# Patient Record
Sex: MALE | Race: WHITE | NOT HISPANIC OR LATINO
[De-identification: names, ages, dates, MRNs, and addresses within clinical notes are randomized per-mention and may not be internally consistent; named-entity substitution may affect disease eponyms.]

---

## 2017-01-20 ENCOUNTER — RECORDS - HEALTHEAST (OUTPATIENT)
Dept: ADMINISTRATIVE | Facility: OTHER | Age: 57
End: 2017-01-20

## 2017-02-17 ENCOUNTER — RECORDS - HEALTHEAST (OUTPATIENT)
Dept: ADMINISTRATIVE | Facility: OTHER | Age: 57
End: 2017-02-17

## 2017-07-05 ENCOUNTER — COMMUNICATION - HEALTHEAST (OUTPATIENT)
Dept: INTERNAL MEDICINE | Facility: CLINIC | Age: 57
End: 2017-07-05

## 2017-07-05 DIAGNOSIS — I50.9 CHF (CONGESTIVE HEART FAILURE) (H): ICD-10-CM

## 2017-07-05 DIAGNOSIS — K21.9 GERD (GASTROESOPHAGEAL REFLUX DISEASE): ICD-10-CM

## 2017-07-05 DIAGNOSIS — I25.10 CAD (CORONARY ARTERY DISEASE): ICD-10-CM

## 2017-07-11 ENCOUNTER — COMMUNICATION - HEALTHEAST (OUTPATIENT)
Dept: CARDIOLOGY | Facility: CLINIC | Age: 57
End: 2017-07-11

## 2017-07-11 DIAGNOSIS — I25.10 CAD (CORONARY ARTERY DISEASE): ICD-10-CM

## 2017-07-11 DIAGNOSIS — I25.10 CORONARY ATHEROSCLEROSIS OF NATIVE CORONARY ARTERY: ICD-10-CM

## 2017-07-26 ENCOUNTER — COMMUNICATION - HEALTHEAST (OUTPATIENT)
Dept: CARDIOLOGY | Facility: CLINIC | Age: 57
End: 2017-07-26

## 2017-07-26 DIAGNOSIS — I25.10 CAD (CORONARY ARTERY DISEASE): ICD-10-CM

## 2017-08-14 ENCOUNTER — OFFICE VISIT - HEALTHEAST (OUTPATIENT)
Dept: CARDIOLOGY | Facility: CLINIC | Age: 57
End: 2017-08-14

## 2017-08-14 DIAGNOSIS — I42.9 CARDIOMYOPATHY (H): ICD-10-CM

## 2017-08-14 DIAGNOSIS — I25.10 CORONARY ARTERY DISEASE INVOLVING NATIVE CORONARY ARTERY OF NATIVE HEART WITHOUT ANGINA PECTORIS: ICD-10-CM

## 2017-08-14 ASSESSMENT — MIFFLIN-ST. JEOR: SCORE: 1841.84

## 2017-08-24 ENCOUNTER — HOSPITAL ENCOUNTER (OUTPATIENT)
Dept: CARDIOLOGY | Facility: CLINIC | Age: 57
Discharge: HOME | End: 2017-08-24
Attending: INTERNAL MEDICINE

## 2017-08-24 DIAGNOSIS — I25.10 CORONARY ARTERY DISEASE INVOLVING NATIVE CORONARY ARTERY OF NATIVE HEART WITHOUT ANGINA PECTORIS: ICD-10-CM

## 2017-08-24 DIAGNOSIS — I42.9 CARDIOMYOPATHY (H): ICD-10-CM

## 2017-08-24 LAB
AORTIC ROOT: 3.3 CM
BSA FOR ECHO PROCEDURE: 2.24 M2
CV ECHO HEIGHT: 70 IN
CV ECHO WEIGHT: 225 LBS
DOP CALC LVOT AREA: 3.14 CM2
DOP CALC LVOT DIAMETER: 2 CM
ECHO EJECTION FRACTION ESTIMATED: 35 %
EJECTION FRACTION: 43 % (ref 55–75)
FRACTIONAL SHORTENING: 21.1 % (ref 28–44)
INTERVENTRICULAR SEPTUM IN END DIASTOLE: 1.04 CM (ref 0.6–1)
IVS/PW RATIO: 1.1
LA AREA 1: 14.6 CM2
LA AREA 2: 13.8 CM2
LEFT ATRIUM LENGTH: 4.11 CM
LEFT ATRIUM SIZE: 4.6 CM
LEFT ATRIUM TO AORTIC ROOT RATIO: 1.39 NO UNITS
LEFT ATRIUM VOLUME INDEX: 18.6 ML/M2
LEFT ATRIUM VOLUME: 41.7 CM3
LEFT VENTRICLE DIASTOLIC VOLUME INDEX: 35.7 CM3/M2 (ref 34–74)
LEFT VENTRICLE DIASTOLIC VOLUME: 80 CM3 (ref 62–150)
LEFT VENTRICLE HEART RATE: 60 BPM
LEFT VENTRICLE MASS INDEX: 129.3 G/M2
LEFT VENTRICLE SYSTOLIC VOLUME INDEX: 20.5 CM3/M2 (ref 11–31)
LEFT VENTRICLE SYSTOLIC VOLUME: 46 CM3 (ref 21–61)
LEFT VENTRICULAR INTERNAL DIMENSION IN DIASTOLE: 6.6 CM (ref 4.2–5.8)
LEFT VENTRICULAR INTERNAL DIMENSION IN SYSTOLE: 5.21 CM (ref 2.5–4)
LEFT VENTRICULAR MASS: 289.7 G
LEFT VENTRICULAR POSTERIOR WALL IN END DIASTOLE: 0.95 CM (ref 0.6–1)
MITRAL VALVE DECELERATION SLOPE: 1320 MM/S2
MITRAL VALVE E/A RATIO: 0.8
MITRAL VALVE PRESSURE HALF-TIME: 104 MS
MV AVERAGE E/E' RATIO: 7.1 CM/S
MV DECELERATION TIME: 261 MS
MV E'TISSUE VEL-LAT: 7.8 CM/S
MV E'TISSUE VEL-MED: 6.63 CM/S
MV LATERAL E/E' RATIO: 6.6
MV MEDIAL E/E' RATIO: 7.8
MV PEAK A VELOCITY: 63.6 CM/S
MV PEAK E VELOCITY: 51.5 CM/S
MV VALVE AREA PRESSURE 1/2 METHOD: 2.1 CM2
NUC REST DIASTOLIC VOLUME INDEX: 3600 LBS
NUC REST SYSTOLIC VOLUME INDEX: 70 IN

## 2017-08-24 ASSESSMENT — MIFFLIN-ST. JEOR: SCORE: 1841.84

## 2017-08-28 ENCOUNTER — RECORDS - HEALTHEAST (OUTPATIENT)
Dept: ADMINISTRATIVE | Facility: OTHER | Age: 57
End: 2017-08-28

## 2017-09-12 ENCOUNTER — COMMUNICATION - HEALTHEAST (OUTPATIENT)
Dept: INTERNAL MEDICINE | Facility: CLINIC | Age: 57
End: 2017-09-12

## 2017-09-28 ENCOUNTER — OFFICE VISIT - HEALTHEAST (OUTPATIENT)
Dept: INTERNAL MEDICINE | Facility: CLINIC | Age: 57
End: 2017-09-28

## 2017-09-28 DIAGNOSIS — M17.9 KNEE OSTEOARTHRITIS: ICD-10-CM

## 2017-09-28 DIAGNOSIS — Z01.818 PRE-OP EXAM: ICD-10-CM

## 2017-09-28 DIAGNOSIS — I25.10 CORONARY ARTERY DISEASE INVOLVING NATIVE CORONARY ARTERY OF NATIVE HEART WITHOUT ANGINA PECTORIS: ICD-10-CM

## 2017-10-02 LAB
ATRIAL RATE - MUSE: 63 BPM
DIASTOLIC BLOOD PRESSURE - MUSE: NORMAL MMHG
INTERPRETATION ECG - MUSE: NORMAL
P AXIS - MUSE: 34 DEGREES
PR INTERVAL - MUSE: 188 MS
QRS DURATION - MUSE: 108 MS
QT - MUSE: 422 MS
QTC - MUSE: 431 MS
R AXIS - MUSE: -28 DEGREES
SYSTOLIC BLOOD PRESSURE - MUSE: NORMAL MMHG
T AXIS - MUSE: 83 DEGREES
VENTRICULAR RATE- MUSE: 63 BPM

## 2017-10-05 ENCOUNTER — COMMUNICATION - HEALTHEAST (OUTPATIENT)
Dept: INTERNAL MEDICINE | Facility: CLINIC | Age: 57
End: 2017-10-05

## 2017-10-05 DIAGNOSIS — I50.9 CHF (CONGESTIVE HEART FAILURE) (H): ICD-10-CM

## 2017-10-05 DIAGNOSIS — K21.9 GERD (GASTROESOPHAGEAL REFLUX DISEASE): ICD-10-CM

## 2017-10-05 DIAGNOSIS — I25.10 CORONARY ATHEROSCLEROSIS OF NATIVE CORONARY ARTERY: ICD-10-CM

## 2017-10-05 DIAGNOSIS — I25.10 CAD (CORONARY ARTERY DISEASE): ICD-10-CM

## 2017-10-16 ENCOUNTER — RECORDS - HEALTHEAST (OUTPATIENT)
Dept: ADMINISTRATIVE | Facility: OTHER | Age: 57
End: 2017-10-16

## 2017-10-30 ENCOUNTER — RECORDS - HEALTHEAST (OUTPATIENT)
Dept: ADMINISTRATIVE | Facility: OTHER | Age: 57
End: 2017-10-30

## 2017-11-27 ENCOUNTER — RECORDS - HEALTHEAST (OUTPATIENT)
Dept: ADMINISTRATIVE | Facility: OTHER | Age: 57
End: 2017-11-27

## 2018-03-13 ENCOUNTER — COMMUNICATION - HEALTHEAST (OUTPATIENT)
Dept: CARDIOLOGY | Facility: CLINIC | Age: 58
End: 2018-03-13

## 2018-04-16 ENCOUNTER — RECORDS - HEALTHEAST (OUTPATIENT)
Dept: ADMINISTRATIVE | Facility: OTHER | Age: 58
End: 2018-04-16

## 2018-07-12 ENCOUNTER — AMBULATORY - HEALTHEAST (OUTPATIENT)
Dept: CARDIOLOGY | Facility: CLINIC | Age: 58
End: 2018-07-12

## 2018-07-12 DIAGNOSIS — I25.10 CAD (CORONARY ARTERY DISEASE): ICD-10-CM

## 2018-07-12 DIAGNOSIS — I25.5 ISCHEMIC CARDIOMYOPATHY: ICD-10-CM

## 2018-08-06 ENCOUNTER — HOSPITAL ENCOUNTER (OUTPATIENT)
Dept: CARDIOLOGY | Facility: CLINIC | Age: 58
Discharge: HOME | End: 2018-08-06
Attending: INTERNAL MEDICINE

## 2018-08-06 DIAGNOSIS — I25.5 ISCHEMIC CARDIOMYOPATHY: ICD-10-CM

## 2018-08-06 DIAGNOSIS — I25.10 CAD (CORONARY ARTERY DISEASE): ICD-10-CM

## 2018-08-06 LAB
AORTIC ROOT: 3.7 CM
BSA FOR ECHO PROCEDURE: 2.25 M2
CV BLOOD PRESSURE: NORMAL MMHG
CV ECHO HEIGHT: 70 IN
CV ECHO WEIGHT: 227 LBS
DOP CALC LVOT AREA: 3.46 CM2
DOP CALC LVOT DIAMETER: 2.1 CM
DOP CALC LVOT PEAK VEL: 66.9 CM/S
DOP CALC LVOT STROKE VOLUME: 39.8 CM3
DOP CALCLVOT PEAK VEL VTI: 11.5 CM
ECHO EJECTION FRACTION ESTIMATED: 35 %
EJECTION FRACTION: 31 % (ref 55–75)
FRACTIONAL SHORTENING: 14.6 % (ref 28–44)
INTERVENTRICULAR SEPTUM IN END DIASTOLE: 0.8 CM (ref 0.6–1)
IVS/PW RATIO: 0.9
LA AREA 1: 16.2 CM2
LA AREA 2: 11.4 CM2
LEFT ATRIUM LENGTH: 4.27 CM
LEFT ATRIUM SIZE: 3.6 CM
LEFT ATRIUM TO AORTIC ROOT RATIO: 0.97 NO UNITS
LEFT ATRIUM VOLUME INDEX: 16.3 ML/M2
LEFT ATRIUM VOLUME: 36.8 ML
LEFT VENTRICLE DIASTOLIC VOLUME INDEX: 76.9 CM3/M2 (ref 34–74)
LEFT VENTRICLE DIASTOLIC VOLUME: 173 CM3 (ref 62–150)
LEFT VENTRICLE MASS INDEX: 84.3 G/M2
LEFT VENTRICLE SYSTOLIC VOLUME INDEX: 53.3 CM3/M2 (ref 11–31)
LEFT VENTRICLE SYSTOLIC VOLUME: 120 CM3 (ref 21–61)
LEFT VENTRICULAR INTERNAL DIMENSION IN DIASTOLE: 5.75 CM (ref 4.2–5.8)
LEFT VENTRICULAR INTERNAL DIMENSION IN SYSTOLE: 4.91 CM (ref 2.5–4)
LEFT VENTRICULAR MASS: 189.7 G
LEFT VENTRICULAR OUTFLOW TRACT MEAN GRADIENT: 1 MMHG
LEFT VENTRICULAR OUTFLOW TRACT MEAN VELOCITY: 50.5 CM/S
LEFT VENTRICULAR OUTFLOW TRACT PEAK GRADIENT: 2 MMHG
LEFT VENTRICULAR POSTERIOR WALL IN END DIASTOLE: 0.93 CM (ref 0.6–1)
LV STROKE VOLUME INDEX: 17.7 ML/M2
MITRAL VALVE E/A RATIO: 0.7
MV E'TISSUE VEL-LAT: 7.8 CM/S
MV LATERAL E/E' RATIO: 5.8
MV PEAK A VELOCITY: 68.6 CM/S
MV PEAK E VELOCITY: 45.4 CM/S
NUC REST DIASTOLIC VOLUME INDEX: 3632 LBS
NUC REST SYSTOLIC VOLUME INDEX: 70 IN
TRICUSPID REGURGITATION PEAK PRESSURE GRADIENT: 21.5 MMHG
TRICUSPID VALVE PEAK REGURGITANT VELOCITY: 232 CM/S

## 2018-08-06 ASSESSMENT — MIFFLIN-ST. JEOR: SCORE: 1850.92

## 2018-08-16 ENCOUNTER — OFFICE VISIT - HEALTHEAST (OUTPATIENT)
Dept: CARDIOLOGY | Facility: CLINIC | Age: 58
End: 2018-08-16

## 2018-08-16 DIAGNOSIS — I25.10 ATHEROSCLEROSIS OF NATIVE CORONARY ARTERY OF NATIVE HEART WITHOUT ANGINA PECTORIS: ICD-10-CM

## 2018-08-16 DIAGNOSIS — I50.20 SYSTOLIC CONGESTIVE HEART FAILURE, UNSPECIFIED CONGESTIVE HEART FAILURE CHRONICITY: ICD-10-CM

## 2018-08-16 DIAGNOSIS — I25.10 CORONARY ARTERY DISEASE INVOLVING NATIVE CORONARY ARTERY OF NATIVE HEART WITHOUT ANGINA PECTORIS: ICD-10-CM

## 2018-08-16 DIAGNOSIS — K21.9 GERD (GASTROESOPHAGEAL REFLUX DISEASE): ICD-10-CM

## 2018-08-16 ASSESSMENT — MIFFLIN-ST. JEOR: SCORE: 1882.67

## 2018-10-17 ENCOUNTER — RECORDS - HEALTHEAST (OUTPATIENT)
Dept: ADMINISTRATIVE | Facility: OTHER | Age: 58
End: 2018-10-17

## 2018-10-18 ENCOUNTER — COMMUNICATION - HEALTHEAST (OUTPATIENT)
Dept: CARDIOLOGY | Facility: CLINIC | Age: 58
End: 2018-10-18

## 2018-10-18 DIAGNOSIS — I25.10 CORONARY ARTERY DISEASE INVOLVING NATIVE CORONARY ARTERY OF NATIVE HEART WITHOUT ANGINA PECTORIS: ICD-10-CM

## 2019-04-19 ENCOUNTER — COMMUNICATION - HEALTHEAST (OUTPATIENT)
Dept: ANTICOAGULATION | Facility: CLINIC | Age: 59
End: 2019-04-19

## 2019-04-19 DIAGNOSIS — I10 ESSENTIAL HYPERTENSION: ICD-10-CM

## 2019-08-22 ENCOUNTER — COMMUNICATION - HEALTHEAST (OUTPATIENT)
Dept: CARDIOLOGY | Facility: CLINIC | Age: 59
End: 2019-08-22

## 2019-08-28 ENCOUNTER — AMBULATORY - HEALTHEAST (OUTPATIENT)
Dept: CARDIOLOGY | Facility: CLINIC | Age: 59
End: 2019-08-28

## 2019-08-28 DIAGNOSIS — I42.9 CARDIOMYOPATHY (H): ICD-10-CM

## 2019-08-28 DIAGNOSIS — I25.10 CAD (CORONARY ARTERY DISEASE): ICD-10-CM

## 2019-09-25 ENCOUNTER — COMMUNICATION - HEALTHEAST (OUTPATIENT)
Dept: CARDIOLOGY | Facility: CLINIC | Age: 59
End: 2019-09-25

## 2019-09-25 DIAGNOSIS — I25.10 CORONARY ARTERY DISEASE INVOLVING NATIVE CORONARY ARTERY OF NATIVE HEART WITHOUT ANGINA PECTORIS: ICD-10-CM

## 2019-09-25 DIAGNOSIS — K21.9 GERD (GASTROESOPHAGEAL REFLUX DISEASE): ICD-10-CM

## 2019-09-25 DIAGNOSIS — I50.20 SYSTOLIC CONGESTIVE HEART FAILURE (H): ICD-10-CM

## 2019-09-27 ENCOUNTER — COMMUNICATION - HEALTHEAST (OUTPATIENT)
Dept: CARDIOLOGY | Facility: CLINIC | Age: 59
End: 2019-09-27

## 2019-09-27 DIAGNOSIS — K21.9 GERD (GASTROESOPHAGEAL REFLUX DISEASE): ICD-10-CM

## 2019-10-01 ENCOUNTER — COMMUNICATION - HEALTHEAST (OUTPATIENT)
Dept: CARDIOLOGY | Facility: CLINIC | Age: 59
End: 2019-10-01

## 2019-10-01 DIAGNOSIS — K21.9 GERD (GASTROESOPHAGEAL REFLUX DISEASE): ICD-10-CM

## 2019-10-15 ENCOUNTER — HOSPITAL ENCOUNTER (OUTPATIENT)
Dept: CARDIOLOGY | Facility: CLINIC | Age: 59
Discharge: HOME | End: 2019-10-15
Attending: INTERNAL MEDICINE

## 2019-10-15 DIAGNOSIS — I25.10 CAD (CORONARY ARTERY DISEASE): ICD-10-CM

## 2019-10-15 DIAGNOSIS — I42.9 CARDIOMYOPATHY (H): ICD-10-CM

## 2019-10-15 LAB
AORTIC ROOT: 3.6 CM
BSA FOR ECHO PROCEDURE: 2.29 M2
CV BLOOD PRESSURE: ABNORMAL %
CV ECHO HEIGHT: 70 IN
CV ECHO WEIGHT: 234 LBS
DOP CALC LVOT AREA: 3.14 CM2
DOP CALC LVOT DIAMETER: 2 CM
DOP CALC LVOT PEAK VEL: 93.2 CM/S
DOP CALC LVOT STROKE VOLUME: 56.5 CM3
DOP CALCLVOT PEAK VEL VTI: 18 CM
ECHO EJECTION FRACTION ESTIMATED: 40 %
EJECTION FRACTION: 48 % (ref 55–75)
FRACTIONAL SHORTENING: 18.9 % (ref 28–44)
INTERVENTRICULAR SEPTUM IN END DIASTOLE: 1.6 CM (ref 0.6–1)
IVS/PW RATIO: 1.3
LA AREA 1: 24.8 CM2
LA AREA 2: 24.8 CM2
LEFT ATRIUM LENGTH: 5.91 CM
LEFT ATRIUM SIZE: 4.3 CM
LEFT ATRIUM TO AORTIC ROOT RATIO: 1.19 NO UNITS
LEFT ATRIUM VOLUME INDEX: 38.6 ML/M2
LEFT ATRIUM VOLUME: 88.5 ML
LEFT VENTRICLE CARDIAC INDEX: 1.4 L/MIN/M2
LEFT VENTRICLE CARDIAC OUTPUT: 3.3 L/MIN
LEFT VENTRICLE DIASTOLIC VOLUME INDEX: 98.3 CM3/M2 (ref 34–74)
LEFT VENTRICLE DIASTOLIC VOLUME: 225 CM3 (ref 62–150)
LEFT VENTRICLE HEART RATE: 58 BPM
LEFT VENTRICLE MASS INDEX: 146.8 G/M2
LEFT VENTRICLE SYSTOLIC VOLUME INDEX: 51.5 CM3/M2 (ref 11–31)
LEFT VENTRICLE SYSTOLIC VOLUME: 118 CM3 (ref 21–61)
LEFT VENTRICULAR INTERNAL DIMENSION IN DIASTOLE: 5.5 CM (ref 4.2–5.8)
LEFT VENTRICULAR INTERNAL DIMENSION IN SYSTOLE: 4.46 CM (ref 2.5–4)
LEFT VENTRICULAR MASS: 336.2 G
LEFT VENTRICULAR OUTFLOW TRACT MEAN GRADIENT: 2 MMHG
LEFT VENTRICULAR OUTFLOW TRACT MEAN VELOCITY: 68.6 CM/S
LEFT VENTRICULAR OUTFLOW TRACT PEAK GRADIENT: 3 MMHG
LEFT VENTRICULAR POSTERIOR WALL IN END DIASTOLE: 1.19 CM (ref 0.6–1)
LV STROKE VOLUME INDEX: 24.7 ML/M2
MITRAL VALVE E/A RATIO: 0.7
MV AVERAGE E/E' RATIO: 7.4 CM/S
MV DECELERATION TIME: 345 MS
MV E'TISSUE VEL-LAT: 6.63 CM/S
MV E'TISSUE VEL-MED: 6.73 CM/S
MV LATERAL E/E' RATIO: 7.5
MV MEDIAL E/E' RATIO: 7.3
MV PEAK A VELOCITY: 75.5 CM/S
MV PEAK E VELOCITY: 49.4 CM/S
NUC REST DIASTOLIC VOLUME INDEX: 3744 LBS
NUC REST SYSTOLIC VOLUME INDEX: 70 IN
TRICUSPID VALVE ANULAR PLANE SYSTOLIC EXCURSION: 2.3 CM

## 2019-10-15 ASSESSMENT — MIFFLIN-ST. JEOR: SCORE: 1882.67

## 2019-10-16 ENCOUNTER — COMMUNICATION - HEALTHEAST (OUTPATIENT)
Dept: CARDIOLOGY | Facility: CLINIC | Age: 59
End: 2019-10-16

## 2019-10-16 DIAGNOSIS — K21.9 GERD (GASTROESOPHAGEAL REFLUX DISEASE): ICD-10-CM

## 2019-10-19 ENCOUNTER — COMMUNICATION - HEALTHEAST (OUTPATIENT)
Dept: CARDIOLOGY | Facility: CLINIC | Age: 59
End: 2019-10-19

## 2019-10-19 DIAGNOSIS — I25.10 ATHEROSCLEROSIS OF NATIVE CORONARY ARTERY OF NATIVE HEART WITHOUT ANGINA PECTORIS: ICD-10-CM

## 2019-10-20 ENCOUNTER — COMMUNICATION - HEALTHEAST (OUTPATIENT)
Dept: CARDIOLOGY | Facility: CLINIC | Age: 59
End: 2019-10-20

## 2019-10-20 DIAGNOSIS — I25.10 CORONARY ARTERY DISEASE INVOLVING NATIVE CORONARY ARTERY OF NATIVE HEART WITHOUT ANGINA PECTORIS: ICD-10-CM

## 2019-10-25 ENCOUNTER — OFFICE VISIT - HEALTHEAST (OUTPATIENT)
Dept: CARDIOLOGY | Facility: CLINIC | Age: 59
End: 2019-10-25

## 2019-10-25 DIAGNOSIS — K21.9 GERD (GASTROESOPHAGEAL REFLUX DISEASE): ICD-10-CM

## 2019-10-25 DIAGNOSIS — I25.10 ATHEROSCLEROSIS OF NATIVE CORONARY ARTERY OF NATIVE HEART WITHOUT ANGINA PECTORIS: ICD-10-CM

## 2019-10-25 DIAGNOSIS — I25.10 CORONARY ARTERY DISEASE INVOLVING NATIVE CORONARY ARTERY OF NATIVE HEART WITHOUT ANGINA PECTORIS: ICD-10-CM

## 2019-10-25 DIAGNOSIS — I50.20 SYSTOLIC CONGESTIVE HEART FAILURE (H): ICD-10-CM

## 2019-10-25 RX ORDER — NITROGLYCERIN 0.3 MG/1
0.3 TABLET SUBLINGUAL EVERY 5 MIN PRN
Qty: 90 TABLET | Refills: 12 | Status: SHIPPED | OUTPATIENT
Start: 2019-10-25 | End: 2021-01-01

## 2020-10-22 ENCOUNTER — AMBULATORY - HEALTHEAST (OUTPATIENT)
Dept: NURSING | Facility: CLINIC | Age: 60
End: 2020-10-22

## 2020-11-12 ENCOUNTER — COMMUNICATION - HEALTHEAST (OUTPATIENT)
Dept: INTERNAL MEDICINE | Facility: CLINIC | Age: 60
End: 2020-11-12

## 2020-11-12 DIAGNOSIS — Z20.822 EXPOSURE TO COVID-19 VIRUS: ICD-10-CM

## 2020-11-17 ENCOUNTER — OFFICE VISIT - HEALTHEAST (OUTPATIENT)
Dept: LAB | Facility: CLINIC | Age: 60
End: 2020-11-17

## 2020-11-17 DIAGNOSIS — Z20.822 EXPOSURE TO COVID-19 VIRUS: ICD-10-CM

## 2020-11-19 ENCOUNTER — COMMUNICATION - HEALTHEAST (OUTPATIENT)
Dept: SCHEDULING | Facility: CLINIC | Age: 60
End: 2020-11-19

## 2020-12-22 ENCOUNTER — COMMUNICATION - HEALTHEAST (OUTPATIENT)
Dept: CARDIOLOGY | Facility: CLINIC | Age: 60
End: 2020-12-22

## 2020-12-22 DIAGNOSIS — I25.10 CORONARY ARTERY DISEASE INVOLVING NATIVE CORONARY ARTERY OF NATIVE HEART WITHOUT ANGINA PECTORIS: ICD-10-CM

## 2020-12-22 DIAGNOSIS — I50.20 SYSTOLIC CONGESTIVE HEART FAILURE (H): ICD-10-CM

## 2021-01-01 ENCOUNTER — AMBULATORY - HEALTHEAST (OUTPATIENT)
Dept: NURSING | Facility: CLINIC | Age: 61
End: 2021-01-01

## 2021-01-01 ENCOUNTER — COMMUNICATION - HEALTHEAST (OUTPATIENT)
Dept: CARDIOLOGY | Facility: CLINIC | Age: 61
End: 2021-01-01

## 2021-01-01 ENCOUNTER — APPOINTMENT (OUTPATIENT)
Dept: CT IMAGING | Facility: HOSPITAL | Age: 61
End: 2021-01-01
Attending: EMERGENCY MEDICINE
Payer: COMMERCIAL

## 2021-01-01 ENCOUNTER — RECORDS - HEALTHEAST (OUTPATIENT)
Dept: ADMINISTRATIVE | Facility: CLINIC | Age: 61
End: 2021-01-01

## 2021-01-01 ENCOUNTER — HEALTH MAINTENANCE LETTER (OUTPATIENT)
Age: 61
End: 2021-01-01

## 2021-01-01 ENCOUNTER — OFFICE VISIT - HEALTHEAST (OUTPATIENT)
Dept: CARDIOLOGY | Facility: CLINIC | Age: 61
End: 2021-01-01

## 2021-01-01 ENCOUNTER — HOSPITAL ENCOUNTER (EMERGENCY)
Facility: HOSPITAL | Age: 61
End: 2021-12-29
Attending: EMERGENCY MEDICINE | Admitting: EMERGENCY MEDICINE
Payer: COMMERCIAL

## 2021-01-01 ENCOUNTER — IMMUNIZATION (OUTPATIENT)
Dept: NURSING | Facility: CLINIC | Age: 61
End: 2021-01-01
Payer: COMMERCIAL

## 2021-01-01 VITALS
RESPIRATION RATE: 16 BRPM | DIASTOLIC BLOOD PRESSURE: 84 MMHG | HEART RATE: 58 BPM | SYSTOLIC BLOOD PRESSURE: 122 MMHG | WEIGHT: 237 LBS | OXYGEN SATURATION: 94 % | BODY MASS INDEX: 34.01 KG/M2

## 2021-01-01 VITALS — BODY MASS INDEX: 32.5 KG/M2 | HEIGHT: 70 IN | WEIGHT: 227 LBS

## 2021-01-01 VITALS — BODY MASS INDEX: 32.21 KG/M2 | WEIGHT: 225 LBS | HEIGHT: 70 IN

## 2021-01-01 VITALS — BODY MASS INDEX: 33.5 KG/M2 | WEIGHT: 234 LBS | HEIGHT: 70 IN

## 2021-01-01 VITALS — WEIGHT: 227.3 LBS | BODY MASS INDEX: 32.61 KG/M2

## 2021-01-01 VITALS — WEIGHT: 234 LBS | BODY MASS INDEX: 33.5 KG/M2 | HEIGHT: 70 IN

## 2021-01-01 VITALS — WEIGHT: 225 LBS | HEIGHT: 70 IN | BODY MASS INDEX: 32.21 KG/M2

## 2021-01-01 DIAGNOSIS — K21.9 GERD (GASTROESOPHAGEAL REFLUX DISEASE): ICD-10-CM

## 2021-01-01 DIAGNOSIS — I25.10 CORONARY ARTERY DISEASE INVOLVING NATIVE CORONARY ARTERY OF NATIVE HEART WITHOUT ANGINA PECTORIS: ICD-10-CM

## 2021-01-01 DIAGNOSIS — S01.21XA LACERATION OF NOSE, INITIAL ENCOUNTER: ICD-10-CM

## 2021-01-01 DIAGNOSIS — I49.01 VENTRICULAR FIBRILLATION (H): ICD-10-CM

## 2021-01-01 DIAGNOSIS — I25.10 CORONARY ARTERY DISEASE INVOLVING NATIVE CORONARY ARTERY OF NATIVE HEART, ANGINA PRESENCE UNSPECIFIED: ICD-10-CM

## 2021-01-01 DIAGNOSIS — I46.9 CARDIAC ARREST (H): ICD-10-CM

## 2021-01-01 DIAGNOSIS — I50.20 SYSTOLIC CONGESTIVE HEART FAILURE (H): ICD-10-CM

## 2021-01-01 DIAGNOSIS — I24.9 ACS (ACUTE CORONARY SYNDROME) (H): ICD-10-CM
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
## 2021-01-01 DIAGNOSIS — I25.10 ATHEROSCLEROSIS OF NATIVE CORONARY ARTERY OF NATIVE HEART WITHOUT ANGINA PECTORIS: ICD-10-CM

## 2021-01-01 LAB
ABO/RH(D): NORMAL
ACANTHOCYTES BLD QL SMEAR: SLIGHT
ALBUMIN SERPL-MCNC: 2.3 G/DL (ref 3.5–5)
ALP SERPL-CCNC: 135 U/L (ref 45–120)
ALT SERPL W P-5'-P-CCNC: 337 U/L (ref 0–45)
ANION GAP SERPL CALCULATED.3IONS-SCNC: 24 MMOL/L (ref 5–18)
ANION GAP SERPL CALCULATED.3IONS-SCNC: 30 MMOL/L (ref 5–18)
ANTIBODY SCREEN: NEGATIVE
APTT PPP: 38 SECONDS (ref 22–38)
AST SERPL W P-5'-P-CCNC: 393 U/L (ref 0–40)
BASE EXCESS BLDA CALC-SCNC: -17.3 MMOL/L (ref -9.6–2)
BASOPHILS # BLD MANUAL: 0 10E3/UL (ref 0–0.2)
BASOPHILS # BLD MANUAL: 0.3 10E3/UL (ref 0–0.2)
BASOPHILS NFR BLD MANUAL: 0 %
BASOPHILS NFR BLD MANUAL: 2 %
BILIRUB SERPL-MCNC: 0.2 MG/DL (ref 0–1)
BUN SERPL-MCNC: 18 MG/DL (ref 8–22)
BUN SERPL-MCNC: 19 MG/DL (ref 8–22)
BURR CELLS BLD QL SMEAR: SLIGHT
CALCIUM SERPL-MCNC: 8.6 MG/DL (ref 8.5–10.5)
CALCIUM SERPL-MCNC: 9.7 MG/DL (ref 8.5–10.5)
CHLORIDE BLD-SCNC: 103 MMOL/L (ref 98–107)
CHLORIDE BLD-SCNC: 104 MMOL/L (ref 98–107)
CO2 SERPL-SCNC: 14 MMOL/L (ref 22–31)
CO2 SERPL-SCNC: 8 MMOL/L (ref 22–31)
COHGB MFR BLD: 33.2 % (ref 96–97)
COHGB MFR BLD: 41.7 % (ref 96–97)
CREAT SERPL-MCNC: 1.94 MG/DL (ref 0.7–1.3)
CREAT SERPL-MCNC: 2.31 MG/DL (ref 0.7–1.3)
EOSINOPHIL # BLD MANUAL: 0.1 10E3/UL (ref 0–0.7)
EOSINOPHIL # BLD MANUAL: 0.2 10E3/UL (ref 0–0.7)
EOSINOPHIL NFR BLD MANUAL: 1 %
EOSINOPHIL NFR BLD MANUAL: 1 %
ERYTHROCYTE [DISTWIDTH] IN BLOOD BY AUTOMATED COUNT: 12.5 % (ref 10–15)
ERYTHROCYTE [DISTWIDTH] IN BLOOD BY AUTOMATED COUNT: 12.6 % (ref 10–15)
GFR SERPL CREATININE-BSD FRML MDRD: 32 ML/MIN/1.73M2
GFR SERPL CREATININE-BSD FRML MDRD: 39 ML/MIN/1.73M2
GLUCOSE BLD-MCNC: 296 MG/DL (ref 70–125)
GLUCOSE BLD-MCNC: 449 MG/DL (ref 70–125)
HCO3 BLDA-SCNC: 10 MMOL/L (ref 23–29)
HCT VFR BLD AUTO: 45.5 % (ref 40–53)
HCT VFR BLD AUTO: 47.6 % (ref 40–53)
HGB BLD-MCNC: 13.8 G/DL (ref 13.3–17.7)
HGB BLD-MCNC: 14.7 G/DL (ref 13.3–17.7)
HOLD SPECIMEN: NORMAL
INR PPP: 1.36 (ref 0.85–1.15)
INR PPP: 1.8 (ref 0.85–1.15)
LACTATE SERPL-SCNC: 17 MMOL/L (ref 0.7–2)
LYMPHOCYTES # BLD MANUAL: 4 10E3/UL (ref 0.8–5.3)
LYMPHOCYTES # BLD MANUAL: 6.6 10E3/UL (ref 0.8–5.3)
LYMPHOCYTES NFR BLD MANUAL: 42 %
LYMPHOCYTES NFR BLD MANUAL: 64 %
MAGNESIUM SERPL-MCNC: 3.4 MG/DL (ref 1.8–2.6)
MCH RBC QN AUTO: 30.3 PG (ref 26.5–33)
MCH RBC QN AUTO: 30.3 PG (ref 26.5–33)
MCHC RBC AUTO-ENTMCNC: 30.3 G/DL (ref 31.5–36.5)
MCHC RBC AUTO-ENTMCNC: 30.9 G/DL (ref 31.5–36.5)
MCV RBC AUTO: 100 FL (ref 78–100)
MCV RBC AUTO: 98 FL (ref 78–100)
METAMYELOCYTES # BLD MANUAL: 0.1 10E3/UL
METAMYELOCYTES # BLD MANUAL: 0.6 10E3/UL
METAMYELOCYTES NFR BLD MANUAL: 1 %
METAMYELOCYTES NFR BLD MANUAL: 4 %
MONOCYTES # BLD MANUAL: 0.4 10E3/UL (ref 0–1.3)
MONOCYTES # BLD MANUAL: 0.5 10E3/UL (ref 0–1.3)
MONOCYTES NFR BLD MANUAL: 3 %
MONOCYTES NFR BLD MANUAL: 7 %
MYELOCYTES # BLD MANUAL: 0.1 10E3/UL
MYELOCYTES # BLD MANUAL: 0.3 10E3/UL
MYELOCYTES NFR BLD MANUAL: 1 %
MYELOCYTES NFR BLD MANUAL: 2 %
NEUTROPHILS # BLD MANUAL: 1.6 10E3/UL (ref 1.6–8.3)
NEUTROPHILS # BLD MANUAL: 7.2 10E3/UL (ref 1.6–8.3)
NEUTROPHILS NFR BLD MANUAL: 26 %
NEUTROPHILS NFR BLD MANUAL: 46 %
NRBC # BLD AUTO: 0.1 10E3/UL
NRBC # BLD AUTO: 0.2 10E3/UL
NRBC BLD MANUAL-RTO: 1 %
NRBC BLD MANUAL-RTO: 1 %
PCO2 BLDA: 80 MM HG (ref 35–45)
PCO2 BLDA: 95 MM HG (ref 35–45)
PH BLDA: 6.85 [PH] (ref 7.37–7.44)
PH BLDA: <6.82 [PH] (ref 7.37–7.44)
PLAT MORPH BLD: ABNORMAL
PLAT MORPH BLD: ABNORMAL
PLATELET # BLD AUTO: 243 10E3/UL (ref 150–450)
PLATELET # BLD AUTO: 325 10E3/UL (ref 150–450)
PO2 BLDA: 32 MM HG (ref 80–90)
PO2 BLDA: 40 MM HG (ref 80–90)
POTASSIUM BLD-SCNC: 3.8 MMOL/L (ref 3.5–5)
POTASSIUM BLD-SCNC: 6.8 MMOL/L (ref 3.5–5)
PROT SERPL-MCNC: 4.9 G/DL (ref 6–8)
RBC # BLD AUTO: 4.56 10E6/UL (ref 4.4–5.9)
RBC # BLD AUTO: 4.85 10E6/UL (ref 4.4–5.9)
RBC MORPH BLD: ABNORMAL
RBC MORPH BLD: ABNORMAL
SODIUM SERPL-SCNC: 141 MMOL/L (ref 136–145)
SODIUM SERPL-SCNC: 142 MMOL/L (ref 136–145)
SPECIMEN EXPIRATION DATE: NORMAL
TROPONIN I SERPL-MCNC: 1.04 NG/ML (ref 0–0.29)
VARIANT LYMPHS BLD QL SMEAR: PRESENT
WBC # BLD AUTO: 15.7 10E3/UL (ref 4–11)
WBC # BLD AUTO: 6.2 10E3/UL (ref 4–11)

## 2021-01-01 PROCEDURE — 91300 PR COVID VAC PFIZER DIL RECON 30 MCG/0.3 ML IM: CPT

## 2021-01-01 PROCEDURE — 70450 CT HEAD/BRAIN W/O DYE: CPT

## 2021-01-01 PROCEDURE — C1769 GUIDE WIRE: HCPCS | Performed by: INTERNAL MEDICINE

## 2021-01-01 PROCEDURE — 90686 IIV4 VACC NO PRSV 0.5 ML IM: CPT

## 2021-01-01 PROCEDURE — 85610 PROTHROMBIN TIME: CPT | Performed by: EMERGENCY MEDICINE

## 2021-01-01 PROCEDURE — 84484 ASSAY OF TROPONIN QUANT: CPT | Performed by: EMERGENCY MEDICINE

## 2021-01-01 PROCEDURE — 85730 THROMBOPLASTIN TIME PARTIAL: CPT | Performed by: EMERGENCY MEDICINE

## 2021-01-01 PROCEDURE — 0004A PR COVID VAC PFIZER DIL RECON 30 MCG/0.3 ML IM: CPT

## 2021-01-01 PROCEDURE — 250N000009 HC RX 250: Performed by: INTERNAL MEDICINE

## 2021-01-01 PROCEDURE — 272N000001 HC OR GENERAL SUPPLY STERILE: Performed by: INTERNAL MEDICINE

## 2021-01-01 PROCEDURE — 85027 COMPLETE CBC AUTOMATED: CPT | Performed by: EMERGENCY MEDICINE

## 2021-01-01 PROCEDURE — 83735 ASSAY OF MAGNESIUM: CPT | Performed by: EMERGENCY MEDICINE

## 2021-01-01 PROCEDURE — 80053 COMPREHEN METABOLIC PANEL: CPT | Performed by: EMERGENCY MEDICINE

## 2021-01-01 PROCEDURE — 250N000011 HC RX IP 250 OP 636: Performed by: INTERNAL MEDICINE

## 2021-01-01 PROCEDURE — 86901 BLOOD TYPING SEROLOGIC RH(D): CPT | Performed by: INTERNAL MEDICINE

## 2021-01-01 PROCEDURE — 82805 BLOOD GASES W/O2 SATURATION: CPT

## 2021-01-01 PROCEDURE — 96374 THER/PROPH/DIAG INJ IV PUSH: CPT | Mod: 59

## 2021-01-01 PROCEDURE — 86850 RBC ANTIBODY SCREEN: CPT | Performed by: INTERNAL MEDICINE

## 2021-01-01 PROCEDURE — C1894 INTRO/SHEATH, NON-LASER: HCPCS | Performed by: INTERNAL MEDICINE

## 2021-01-01 PROCEDURE — 90471 IMMUNIZATION ADMIN: CPT

## 2021-01-01 PROCEDURE — 36415 COLL VENOUS BLD VENIPUNCTURE: CPT | Performed by: EMERGENCY MEDICINE

## 2021-01-01 PROCEDURE — 93454 CORONARY ARTERY ANGIO S&I: CPT | Performed by: INTERNAL MEDICINE

## 2021-01-01 PROCEDURE — 83605 ASSAY OF LACTIC ACID: CPT | Performed by: INTERNAL MEDICINE

## 2021-01-01 PROCEDURE — 36415 COLL VENOUS BLD VENIPUNCTURE: CPT | Performed by: INTERNAL MEDICINE

## 2021-01-01 PROCEDURE — 92960 CARDIOVERSION ELECTRIC EXT: CPT

## 2021-01-01 PROCEDURE — 31500 INSERT EMERGENCY AIRWAY: CPT

## 2021-01-01 PROCEDURE — 93454 CORONARY ARTERY ANGIO S&I: CPT | Mod: 26 | Performed by: INTERNAL MEDICINE

## 2021-01-01 PROCEDURE — 99285 EMERGENCY DEPT VISIT HI MDM: CPT | Mod: 25

## 2021-01-01 PROCEDURE — 93454 CORONARY ARTERY ANGIO S&I: CPT

## 2021-01-01 RX ORDER — DOBUTAMINE HYDROCHLORIDE 200 MG/100ML
INJECTION INTRAVENOUS CONTINUOUS PRN
Status: COMPLETED | OUTPATIENT
Start: 2021-01-01 | End: 2021-01-01

## 2021-01-01 RX ORDER — ASCORBIC ACID 500 MG
500 TABLET ORAL DAILY
Status: SHIPPED | COMMUNITY
Start: 2021-01-01

## 2021-01-01 RX ORDER — ATROPINE SULFATE 0.1 MG/ML
1 INJECTION INTRAVENOUS ONCE
Status: DISCONTINUED | OUTPATIENT
Start: 2021-01-01 | End: 2021-12-29 | Stop reason: HOSPADM

## 2021-01-01 RX ORDER — ASPIRIN 81 MG/1
324 TABLET, CHEWABLE ORAL ONCE
Status: DISCONTINUED | OUTPATIENT
Start: 2021-01-01 | End: 2021-12-29 | Stop reason: HOSPADM

## 2021-01-01 RX ORDER — METOPROLOL SUCCINATE 50 MG/1
TABLET, EXTENDED RELEASE ORAL
Qty: 180 TABLET | Refills: 3 | Status: SHIPPED | OUTPATIENT
Start: 2021-01-01

## 2021-01-01 RX ORDER — NOREPINEPHRINE BITARTRATE 0.02 MG/ML
.01-.6 INJECTION, SOLUTION INTRAVENOUS CONTINUOUS
Status: DISCONTINUED | OUTPATIENT
Start: 2021-01-01 | End: 2021-12-29 | Stop reason: ALTCHOICE

## 2021-01-01 RX ORDER — LOSARTAN POTASSIUM 25 MG/1
25 TABLET ORAL DAILY
Qty: 90 TABLET | Refills: 3 | Status: SHIPPED | OUTPATIENT
Start: 2021-01-01

## 2021-01-01 RX ORDER — EPINEPHRINE IN SOD CHLOR,ISO 1 MG/10 ML
SYRINGE (ML) INTRAVENOUS
Status: DISCONTINUED
Start: 2021-01-01 | End: 2021-01-01 | Stop reason: HOSPADM

## 2021-01-01 RX ORDER — LACTOBACILLUS RHAMNOSUS GG 15B CELL
1 CAPSULE, SPRINKLE ORAL
Status: SHIPPED | COMMUNITY
Start: 2021-01-01

## 2021-01-01 RX ORDER — EPINEPHRINE IN SOD CHLOR,ISO 1 MG/10 ML
SYRINGE (ML) INTRAVENOUS
Status: DISCONTINUED | OUTPATIENT
Start: 2021-01-01 | End: 2021-12-29 | Stop reason: HOSPADM

## 2021-01-01 RX ORDER — SPIRONOLACTONE 25 MG/1
TABLET ORAL
Qty: 90 TABLET | Refills: 3 | Status: SHIPPED | OUTPATIENT
Start: 2021-01-01

## 2021-06-02 NOTE — PROGRESS NOTES
[facility] Care Note    Assessment / Plan:    Mr. Carey is doing well from the cardiac standpoint having had prior multivessel PCI for severe coronary artery disease and a severe ischemic cardiomyopathy.      As detailed below, he had successful PCI of a chronically occluded right coronary artery as well as a diseased circumflex which resulted in an improvement in his ejection fraction from 25% to 40% and a significant improvement in his functional capacity.      His anterior wall was assessed with cardiac MRI and was found to be largely nonviable, which is why the LAD  has been left untreated.      As he is currently doing well, the goal will be ongoing risk factor modification for which he has been asked to continue his current medical regimen.      He also knows to continue his dual antiplatelet therapy, but should be able to stop it for any elective procedures.     A repeat echocardiogram was also obtained to reassess his left ventricular systolic function and showed stability to perhaps slight improvement, with an EF of 40-45 %.        Thank you for the opportunity to participate in the care of Joey Carey. Please do not hesitate to call with any questions or concerns regarding his cardiovascular status    ______________________________________________________________________    Subjective:    It was a pleasure to see Joey Carey at the [facility] in follow-up for his coronary artery disease and cardiomyopathy.       Joey Carey is a 59 y.o. male who presented with accelerating chest pain on June 24, 2015 and had an EKG that was concerning for an anterior ST elevation myocardial infarction. He underwent urgent coronary angiography at the time and was found to have an occluded mid LAD, occluded mid RCA and 90% ulcerated lesion in the proximal to mid circumflex. He was referred for coronary artery bypass surgery but further evaluation during his hospitalization revealed a  large left ventricular thrombus with an ejection fraction of 25-30%.       Given the presence of the thrombus his bypass surgery was canceled and a viability study was scheduled. It was felt that he would be a better candidate for percutaneous revascularization if it was felt to be an option.       His MRI viability study subsequently showed good viability in the inferoseptal region. The mid to distal anterior wall and apex did not appear to be viable however. The MRI again confirmed the presence of a large apical thrombus with ejection fraction of 24%.      He then underwent PCI of his proximal circumflex as well as a moderate to large diagonal with Promus drug eluting stents.      He returned a few weeks later and underwent successful PCI of the occluded RCA, and following that revascularization had an improvement in his left ventricular systolic function to 35-40%.      He subsequently had resolution of his apical thrombus and good clinical improvement with resumption of normal activities and increase in his functional capacity.      In 2016, he was hospitalized with lower GI bleed and an associated non-ST elevation myocardial infarction.  He underwent coronary angiography which showed focal restenosis of his proximal RCA that was treated with cutting balloon angioplasty.  His circumflex had patent stents, and the LAD as before was chronically occluded with septal collaterals to the mid LAD.      Since then, he has done well with no recurrent cardiac symptoms. Specifically denies any chest discomfort or shortness of breath and has been golfing almost every day this summer.     He did have another echocardiogram performed on October 17  ______________________________________________________________________    Problem List:  Patient Active Problem List   Diagnosis     Dyslipidemia, goal LDL below 70     Hypertension     GERD (gastroesophageal reflux disease)     Left ventricular apical thrombus     Calculus of  ureter     Hydronephrosis with urinary obstruction due to ureteral calculus [592.1, 591]     GI bleed     Ischemic cardiomyopathy     CAD (coronary artery disease)     Dyslipidemia     Non-ST elevation myocardial infarction (NSTEMI) (H)       Medical History:  Past Medical History:   Diagnosis Date     CAD (coronary artery disease)      Dyslipidemia, goal LDL below 70      History of anesthesia complications     Nausea     Ischemic cardiomyopathy      Kidney stone      Left ventricular thrombosis      Myocardial infarction (H)      STEMI (ST elevation myocardial infarction) (H)        Surgical History:  Past Surgical History:   Procedure Laterality Date     CORONARY STENT PLACEMENT      Dr. Bansal     ESOPHAGOGASTRODUODENOSCOPY N/A 7/5/2016    Procedure: ESOPHAGOGASTRODUODENOSCOPY;  Surgeon: Fahad Donald MD;  Location: [facility];  Service:      UT KNEE SCOPE,DIAGNOSTIC      Description: Arthroscopy Knee Left;  Recorded: 07/06/2009;  Comments: 1990       Social History:  Social History     Socioeconomic History     Marital status:      Spouse name: Not on file     Number of children: Not on file     Years of education: Not on file     Highest education level: Not on file   Occupational History     Occupation: Retired   Social Needs     Financial resource strain: Not on file     Food insecurity:     Worry: Not on file     Inability: Not on file     Transportation needs:     Medical: Not on file     Non-medical: Not on file   Tobacco Use     Smoking status: Never Smoker     Smokeless tobacco: Never Used   Substance and Sexual Activity     Alcohol use: Yes     Alcohol/week: 4.0 - 5.0 standard drinks     Types: 4 - 5 Glasses of wine per week     Drug use: No     Sexual activity: Yes     Partners: Female     Birth control/protection: None   Lifestyle     Physical activity:     Days per week: Not on file     Minutes per session: Not on file     Stress: Not on file   Relationships     Social connections:     Talks  on phone: Not on file     Gets together: Not on file     Attends Restorationist service: Not on file     Active member of club or organization: Not on file     Attends meetings of clubs or organizations: Not on file     Relationship status: Not on file     Intimate partner violence:     Fear of current or ex partner: Not on file     Emotionally abused: Not on file     Physically abused: Not on file     Forced sexual activity: Not on file   Other Topics Concern     Not on file   Social History Narrative     Not on file       Review of Systems: 12 organ system review done and negative except as noted in the HPI.    Family History:  Family History   Problem Relation Age of Onset     Hyperlipidemia Mother      Hypertension Mother      Stroke Mother      Heart disease Father      Hypertension Father      Hyperlipidemia Father      No Medical Problems Sister      Hyperlipidemia Brother      Hypertension Brother      Urolithiasis Brother      No Medical Problems Daughter      No Medical Problems Son      No Medical Problems Maternal Aunt      No Medical Problems Maternal Uncle      No Medical Problems Paternal Aunt      No Medical Problems Paternal Uncle      No Medical Problems Maternal Grandmother      Colon cancer Maternal Grandfather      No Medical Problems Paternal Grandmother      No Medical Problems Paternal Grandfather          Allergies:  Allergies   Allergen Reactions     Crestor [Rosuvastatin] Myalgia     Lipitor [Atorvastatin] Myalgia     Pravastatin Myalgia       Medications:  Current Outpatient Medications   Medication Sig Dispense Refill     acetaminophen (TYLENOL) 650 MG CR tablet Take 650 mg by mouth every 8 (eight) hours as needed for pain.       aspirin 81 MG EC tablet Take 81 mg by mouth bedtime.        cholecalciferol, vitamin D3, (VITAMIN D3) 2,000 unit cap Take 2,000 Units by mouth daily.        losartan (COZAAR) 25 MG tablet Take 1 tablet (25 mg total) by mouth daily. 90 tablet 4     metoprolol  succinate (TOPROL-XL) 50 MG 24 hr tablet TAKE 1 TABLET BY MOUTH TWICE A  tablet 4     omeprazole (PRILOSEC) 20 MG capsule Take 1 capsule (20 mg total) by mouth daily. 90 capsule 3     spironolactone (ALDACTONE) 25 MG tablet TAKE 1 TABLET BY MOUTH EVERY DAY 90 tablet 4     ticagrelor (BRILINTA) 90 mg Tab Take 1 tablet (90 mg total) by mouth 2 (two) times a day. 180 tablet 4     nitroglycerin (NITROSTAT) 0.3 MG SL tablet Place 1 tablet (0.3 mg total) under the tongue every 5 (five) minutes as needed for chest pain. 90 tablet 12     No current facility-administered medications for this visit.        Objective:   Vital signs:  /84 (Patient Site: Left Arm, Patient Position: Sitting, Cuff Size: Adult Regular)   Pulse (!) 58   Resp 16   Wt (!) 237 lb (107.5 kg)   SpO2 94%   BMI 34.01 kg/m        Physical Exam:    GENERAL APPEARANCE: Alert, cooperative and in no acute distress.  HEENT: No scleral icterus. No Xanthelasma. Oral mucuos membranes pink and moist.  NECK: No JVD. Thyroid not visualized  CHEST: clear to auscultation  CARDIOVASCULAR: S1, S2 regular. No significant murmur noted.   PULSES: Radial and posterior tibial pulses are intact and symmetric.   ABDOMEN: Nontender. BS+.   EXTREMITIES: No cyanosis, clubbing or edema.  SKIN: Warm, well perfused  NEURO: Grossly nonfocal    Lab Results:  LIPIDS:  Lab Results   Component Value Date    CHOL 187 08/16/2016    CHOL 116 08/11/2015     Lab Results   Component Value Date    HDL 45 08/16/2016    HDL 32 (L) 08/11/2015     Lab Results   Component Value Date    LDLCALC 125 08/16/2016    LDLCALC 61 08/11/2015     Lab Results   Component Value Date    TRIG 83 08/16/2016    TRIG 117 08/11/2015     No components found for: CHOLHDL    BMP:  Lab Results   Component Value Date    CREATININE 1.11 09/28/2017    BUN 16 09/28/2017     09/28/2017    K 4.1 09/28/2017     09/28/2017    CO2 22 09/28/2017         ECG, Echo and Cath films independently  reviewed      RUSSEL TOM MD  [facility]

## 2021-06-02 NOTE — PATIENT INSTRUCTIONS - HE
It was good to see you in clinic today.    We are glad that you are continuing to do well.    Your recent echocardiogram was reviewed and continues to show stable heart function, with perhaps a slight improvement from before.    We would recommend staying on your current medications, and call anytime with questions or concerns.    Kane Bansal MD

## 2021-06-12 NOTE — PROGRESS NOTES
[facility] Heart Care Note    Assessment / Plan:    Mr. Carey is doing well from the cardiac standpoint having had prior multivessel PCI for severe coronary artery disease and a severe ischemic cardiomyopathy.     As detailed below, he had successful PCI of a chronically occluded right coronary artery as well as a diseased circumflex which resulted in an improvement in his ejection fraction from 25% to 45% and a significant improvement in his functional capacity.     His anterior wall was assessed with cardiac MRI and was found to be largely nonviable, which is why the LAD  has been left untreated.     As he is currently doing well, the goal will be ongoing risk factor modification for which he has been asked to continue his current medical regimen.     He does report intolerance to statins however and will therefore be evaluated for initiation of a PCSK9 inhibitor.  He also knows to continue his dual antiplatelet therapy for another year, but should be able to stop it for any elective procedures such as his planned knee replacement.    A repeat echocardiogram will be obtained to reassess his left ventricular systolic function to ensure stability.        Thank you for the opportunity to participate in the care of Joey Carey. Please do not hesitate to call with any questions or concerns regarding his cardiovascular status    ______________________________________________________________________    Subjective:    It was a pleasure to see Joey Carey at the [facility] in follow-up for his coronary artery disease and cardiomyopathy.      Joey Carey is a 55 y.o. male who presented with accelerating chest pain on June 24, 2015 and had an EKG that was concerning for an anterior ST elevation myocardial infarction. He underwent urgent coronary angiography at the time and was found to have an occluded mid LAD, occluded mid RCA and 90% ulcerated lesion in the proximal to mid circumflex. He  was referred for coronary artery bypass surgery but further evaluation during his hospitalization revealed a large left ventricular thrombus with an ejection fraction of 25-30%.      Given the presence of the thrombus his bypass surgery was canceled and a viability study was scheduled. It was felt that he would be a better candidate for percutaneous revascularization if it was felt to be an option.      His MRI viability study subsequently showed good viability in the inferoseptal region. The mid to distal anterior wall and apex did not appear to be viable however. The MRI again confirmed the presence of a large apical thrombus with ejection fraction of 24%.     He then underwent PCI of his proximal circumflex as well as a moderate to large diagonal with Promus drug eluting stents.     He returned a few weeks later and underwent successful PCI of the occluded RCA, and following that revascularization had an improvement in his left ventricular systolic function to 45%.     He subsequently had resolution of his apical thrombus and good clinical improvement with resumption of normal activities and increase in his functional capacity.     Last year he was hospitalized with lower GI bleed and an associated non-ST elevation myocardial infarction.  He underwent coronary angiography which showed focal restenosis of his proximal RCA that was treated with cutting balloon angioplasty.  His circumflex had patent stents, and the LAD as before was chronically occluded with septal collaterals to the mid LAD.     He states that he is doing well at this time with no recurrent symptoms.  Specifically denies any chest discomfort or shortness of breath and is looking for to increasing his activity level.     He does complain of increasing discomfort in his left knee from osteoarthritis, and is looking forward to having a knee replacement this fall.  ______________________________________________________________________    Problem  List:  Patient Active Problem List   Diagnosis     Dyslipidemia, goal LDL below 70     Hypertension     GERD (gastroesophageal reflux disease)     Left ventricular apical thrombus     Calculus of ureter     Hydronephrosis with urinary obstruction due to ureteral calculus [592.1, 591]     Urinary calculus, unspecified     GI bleed     Ischemic cardiomyopathy     CAD (coronary artery disease)     Dyslipidemia     Non-ST elevation myocardial infarction (NSTEMI)       Medical History:  Past Medical History:   Diagnosis Date     CAD (coronary artery disease)      Dyslipidemia, goal LDL below 70      History of anesthesia complications     Nausea     Ischemic cardiomyopathy      Kidney stone      Left ventricular thrombosis      Myocardial infarction      STEMI (ST elevation myocardial infarction)        Surgical History:  Past Surgical History:   Procedure Laterality Date     CORONARY STENT PLACEMENT      Dr. Bansal     ESOPHAGOGASTRODUODENOSCOPY N/A 7/5/2016    Procedure: ESOPHAGOGASTRODUODENOSCOPY;  Surgeon: Fahad Donald MD;  Location: [facility];  Service:      CA KNEE SCOPE,DIAGNOSTIC      Description: Arthroscopy Knee Left;  Recorded: 07/06/2009;  Comments: 1990       Social History:  Social History     Social History     Marital status:      Spouse name: N/A     Number of children: N/A     Years of education: N/A     Occupational History     Retired      Social History Main Topics     Smoking status: Never Smoker     Smokeless tobacco: Never Used     Alcohol use 2.4 - 3.0 oz/week     4 - 5 Glasses of wine per week     Drug use: No     Sexual activity: Yes     Partners: Female     Birth control/ protection: None     Other Topics Concern     Not on file     Social History Narrative       Review of Systems: 12 organ system review done and negative except as noted in the HPI.    Family History:  Family History   Problem Relation Age of Onset     Hyperlipidemia Mother      Hypertension Mother      Stroke Mother       Heart disease Father      Hypertension Father      Hyperlipidemia Father      No Medical Problems Sister      Hyperlipidemia Brother      Hypertension Brother      Urolithiasis Brother      No Medical Problems Daughter      No Medical Problems Son      No Medical Problems Maternal Aunt      No Medical Problems Maternal Uncle      No Medical Problems Paternal Aunt      No Medical Problems Paternal Uncle      No Medical Problems Maternal Grandmother      Colon cancer Maternal Grandfather      No Medical Problems Paternal Grandmother      No Medical Problems Paternal Grandfather          Allergies:  Allergies   Allergen Reactions     Crestor [Rosuvastatin] Myalgia     Lipitor [Atorvastatin] Myalgia     Pravastatin Myalgia       Medications:  Current Outpatient Prescriptions   Medication Sig Dispense Refill     acetaminophen (TYLENOL) 650 MG CR tablet Take 650 mg by mouth every 8 (eight) hours as needed for pain.       aspirin 81 MG EC tablet Take 81 mg by mouth bedtime.        cholecalciferol, vitamin D3, (VITAMIN D3) 2,000 unit cap Take 2,000 Units by mouth daily.        diclofenac sodium (VOLTAREN) 1 % Gel Apply 3 x/ daily for 1-2 minutes 100 g 3     losartan (COZAAR) 25 MG tablet Take 1 tablet (25 mg total) by mouth daily. 90 tablet 0     metoprolol succinate (TOPROL-XL) 50 MG 24 hr tablet TAKE 1 TABLET BY MOUTH TWICE DAILY 180 tablet 0     omeprazole (PRILOSEC) 20 MG capsule Take 1 capsule (20 mg total) by mouth daily. 90 capsule 0     spironolactone (ALDACTONE) 25 MG tablet TAKE 1 TABLET BY MOUTH DAILY 90 tablet 0     ticagrelor (BRILINTA) 90 mg Tab Take 1 tablet (90 mg total) by mouth 2 (two) times a day. 180 tablet 4     nitroglycerin (NITROSTAT) 0.3 MG SL tablet Place 1 tablet (0.3 mg total) under the tongue every 5 (five) minutes as needed for chest pain. 90 tablet 12     trolamine salicylate (ASPERCREME) 10 % cream Apply 1 application topically daily as needed (shoulder pain).       No current  "facility-administered medications for this visit.        Objective:   Vital signs:  /78 (Patient Site: Left Arm, Patient Position: Sitting, Cuff Size: Adult Large)  Pulse 64  Resp 18  Ht 5' 10\" (1.778 m)  Wt (!) 225 lb (102.1 kg)  BMI 32.28 kg/m2      Physical Exam:    GENERAL APPEARANCE: Alert, cooperative and in no acute distress.  HEENT: No scleral icterus. No Xanthelasma. Oral mucuos membranes pink and moist.  NECK: No JVD. Thyroid not visualized  CHEST: clear to auscultation  CARDIOVASCULAR: S1, S2 regular.    PULSES: Radial and posterior tibial pulses are intact and symmetric.   ABDOMEN: Nontender. BS+.   EXTREMITIES: No cyanosis, clubbing or edema.  SKIN: Warm, well perfused  NEURO: Grossly nonfocal    Lab Results:  LIPIDS:  Lab Results   Component Value Date    CHOL 187 08/16/2016    CHOL 116 08/11/2015     Lab Results   Component Value Date    HDL 45 08/16/2016    HDL 32 (L) 08/11/2015     Lab Results   Component Value Date    LDLCALC 125 08/16/2016    LDLCALC 61 08/11/2015     Lab Results   Component Value Date    TRIG 83 08/16/2016    TRIG 117 08/11/2015     No components found for: CHOLHDL    BMP:  Lab Results   Component Value Date    CREATININE 0.82 08/16/2016    BUN 20 08/16/2016     08/16/2016    K 4.5 08/16/2016     08/16/2016    CO2 25 08/16/2016         ECG and Cath films independently reviewed      RUSSEL TOM MD  [facility]              "

## 2021-06-13 NOTE — TELEPHONE ENCOUNTER
I contacted patient and advised I will follow to see if we can get this set up this weekend.  He is agreeable.  Shelley RUCKER CMA

## 2021-06-13 NOTE — PROGRESS NOTES
Assessment/Plan:      Visit for Preoperative Exam.       No contraindications for the planned procedure.  CBC, CMP, INR, APTT, type and cross, EKG  done today.EKG per my review - some change in T waves comparing to previous one done in 7/2016.  ADDENDUM:  All discussed with Dr Bansal, his Cardiologist:             Some changes on his ECG compared to prior; but nothing that would warrant further workup especially in the absence of symptoms.     I think it is ok for him to move forward with the surgery; but would recommend that anesthesia monitor his volume status closely given his cardiomyopathy (Echo shows an EF of 35%). Also if he can stay on his Toprol through the surgery that would be helpful.          You should stop taking aspirin and NSAID's ( ibuprofene, naproxen) 7-10 days prior procedure.  You should stop all supplements 10 days prior procedure ( fish oil, Q10, vitamins, etc).    Please take all your heart medications that you usually take in the morning on the morning of your procedure.    He also knows to continue his dual antiplatelet therapy for another year, but should be able to stop it for any elective procedures such as his planned knee replacement.    Subjective:     Scheduled Procedure: left knee replacement  Surgery Date:  10/16/17  Surgery Location:  [place] [id number]  Surgeon:  Dr. Mcdonald    Patient has severe left knee osteoarthritis.  He had successful PCI of a chronically occluded right coronary artery as well as a diseased circumflex which resulted in an improvement in his ejection fraction from 25% to 45% and a significant improvement in his functional capacity.His anterior wall was assessed with cardiac MRI and was found to be largely nonviable, which is why the LAD  has been left untreated.  As he is currently doing well, the goal will be ongoing risk factor modification for which he has been asked to continue his current medical regimen.  He does report  intolerance to statins however and will therefore be evaluated for initiation of a PCSK9 inhibitor.  He also knows to continue his dual antiplatelet therapy for another year, but should be able to stop it for any elective procedures such as his planned knee replacement.  He has history of GI bleed in July 2016. He underwent EGD without a bleeding source identified and received 1 unit pRBC. He never proceeded with colonoscopy.    Current Outpatient Prescriptions   Medication Sig Dispense Refill     acetaminophen (TYLENOL) 650 MG CR tablet Take 650 mg by mouth every 8 (eight) hours as needed for pain.       aspirin 81 MG EC tablet Take 81 mg by mouth bedtime.        cholecalciferol, vitamin D3, (VITAMIN D3) 2,000 unit cap Take 2,000 Units by mouth daily.        losartan (COZAAR) 25 MG tablet Take 1 tablet (25 mg total) by mouth daily. 90 tablet 0     metoprolol succinate (TOPROL-XL) 50 MG 24 hr tablet TAKE 1 TABLET BY MOUTH TWICE DAILY 180 tablet 0     omeprazole (PRILOSEC) 20 MG capsule Take 1 capsule (20 mg total) by mouth daily. 90 capsule 0     spironolactone (ALDACTONE) 25 MG tablet TAKE 1 TABLET BY MOUTH DAILY 90 tablet 0     ticagrelor (BRILINTA) 90 mg Tab Take 1 tablet (90 mg total) by mouth 2 (two) times a day. 180 tablet 4     nitroglycerin (NITROSTAT) 0.3 MG SL tablet Place 1 tablet (0.3 mg total) under the tongue every 5 (five) minutes as needed for chest pain. 90 tablet 12     No current facility-administered medications for this visit.        Allergies   Allergen Reactions     Crestor [Rosuvastatin] Myalgia     Lipitor [Atorvastatin] Myalgia     Pravastatin Myalgia       Immunization History   Administered Date(s) Administered     DT (pediatric) 09/16/1998     Influenza, seasonal,quad inj 36+ mos 11/17/2015     Td, historic 09/16/1998     Tdap 08/11/2015       Patient Active Problem List   Diagnosis     Dyslipidemia, goal LDL below 70     Hypertension     GERD (gastroesophageal reflux disease)     Left  ventricular apical thrombus     Calculus of ureter     Hydronephrosis with urinary obstruction due to ureteral calculus [592.1, 591]     GI bleed     Ischemic cardiomyopathy     CAD (coronary artery disease)     Dyslipidemia     Non-ST elevation myocardial infarction (NSTEMI)       Past Medical History:   Diagnosis Date     CAD (coronary artery disease)      Dyslipidemia, goal LDL below 70      History of anesthesia complications     Nausea     Ischemic cardiomyopathy      Kidney stone      Left ventricular thrombosis      Myocardial infarction      STEMI (ST elevation myocardial infarction)        Social History     Social History     Marital status:      Spouse name: N/A     Number of children: N/A     Years of education: N/A     Occupational History     Retired      Social History Main Topics     Smoking status: Never Smoker     Smokeless tobacco: Never Used     Alcohol use 2.4 - 3.0 oz/week     4 - 5 Glasses of wine per week     Drug use: No     Sexual activity: Yes     Partners: Female     Birth control/ protection: None     Other Topics Concern     Not on file     Social History Narrative       Past Surgical History:   Procedure Laterality Date     CORONARY STENT PLACEMENT      Dr. Bansal     ESOPHAGOGASTRODUODENOSCOPY N/A 7/5/2016    Procedure: ESOPHAGOGASTRODUODENOSCOPY;  Surgeon: Fahad Donald MD;  Location: [facility];  Service:      KS KNEE SCOPE,DIAGNOSTIC      Description: Arthroscopy Knee Left;  Recorded: 07/06/2009;  Comments: 1990       History of Present Illness  Recent Health  Fever: no  Chills: no  Fatigue: no  Chest Pain: no  Cough: no  Dyspnea: no  Urinary Frequency: no  Nausea: no  Vomiting: no  Diarrhea: no  Abdominal Pain: no  Easy Bruising: no  Lower Extremity Swelling: no  Poor Exercise Tolerance: no    Most recent Health Maintenance Visit:  1 year(s) ago    Pertinent History  Prior Anesthesia: yes  Previous Anesthesia Reaction:  no  Diabetes: no  Cardiovascular Disease:  no  Pulmonary Disease: no  Renal Disease: no  GI Disease: no  Sleep Apnea: no  Thromboembolic Problems: no  Clotting Disorder: no  Bleeding Disorder: no  Transfusion Reaction: no  Impaired Immunity: no  Steroid use in the last 6 months: no  Frequent Aspirin use: yes    Family history of MI and Stroke-mother, father    Social history of patient does not wear denture or partial plates, there is no transfusion refusal, NSAID use and there are no concerns regarding care after surgery    After surgery, the patient plans to recover at home with family.    Review of Systems  Constitutional: Negative.    HENT: Negative.    Eyes: Negative.    Respiratory: Negative.    Cardiovascular: Negative.    Gastrointestinal: Negative.    Endocrine: Negative.    Genitourinary: Negative.    Musculoskeletal: Negative.    Skin: Negative.              Objective:         Vitals:    09/28/17 1330   BP: 120/80   Pulse: 64   Weight: (!) 227 lb 4.8 oz (103.1 kg)       Physical Exam:  Constitutional:  oriented to person, place, and time, appears well-nourished. No distress.   HENT:   Head: Normocephalic.   Mouth/Throat: Oropharynx is clear and moist.   Eyes: Conjunctivae are normal. Pupils are equal, round, and reactive to light.   Neck: Normal range of motion. Neck supple.   Cardiovascular: Normal rate, regular rhythm and normal heart sounds.    Pulmonary/Chest: Effort normal and breath sounds normal.   Abdominal: Soft. Bowel sounds are normal.   Musculoskeletal: Normal range of motion.   Neurological: alert and oriented to person, place, and time. Skin: Skin is warm.   Psychiatric: normal mood and affect.

## 2021-06-19 NOTE — PROGRESS NOTES
[facility]    Assessment / Plan:    Mr. Carey is doing well from the cardiac standpoint having had prior multivessel PCI for severe coronary artery disease and a severe ischemic cardiomyopathy.      As detailed below, he had successful PCI of a chronically occluded right coronary artery as well as a diseased circumflex which resulted in an improvement in his ejection fraction from 25% to 40% and a significant improvement in his functional capacity.      His anterior wall was assessed with cardiac MRI and was found to be largely nonviable, which is why the LAD  has been left untreated.      As he is currently doing well, the goal will be ongoing risk factor modification for which he has been asked to continue his current medical regimen.      He does report intolerance to statins however and will therefore be evaluated for initiation of a PCSK9 inhibitor.  He also knows to continue his dual antiplatelet therapy, but should be able to stop it for any elective procedures.     A repeat echocardiogram was also obtained to reassess his left ventricular systolic function and showed stability with an EF of 35%.          Thank you for the opportunity to participate in the care of Joey Carey. Please do not hesitate to call with any questions or concerns regarding his cardiovascular status    ______________________________________________________________________    Subjective:    It was a pleasure to see Joey Carey at the [facility] in follow-up for his coronary artery disease and cardiomyopathy.       Joey Carey is a 58 y.o. male who presented with accelerating chest pain on June 24, 2015 and had an EKG that was concerning for an anterior ST elevation myocardial infarction. He underwent urgent coronary angiography at the time and was found to have an occluded mid LAD, occluded mid RCA and 90% ulcerated lesion in the proximal to mid circumflex. He was referred for coronary  artery bypass surgery but further evaluation during his hospitalization revealed a large left ventricular thrombus with an ejection fraction of 25-30%.       Given the presence of the thrombus his bypass surgery was canceled and a viability study was scheduled. It was felt that he would be a better candidate for percutaneous revascularization if it was felt to be an option.       His MRI viability study subsequently showed good viability in the inferoseptal region. The mid to distal anterior wall and apex did not appear to be viable however. The MRI again confirmed the presence of a large apical thrombus with ejection fraction of 24%.      He then underwent PCI of his proximal circumflex as well as a moderate to large diagonal with Promus drug eluting stents.      He returned a few weeks later and underwent successful PCI of the occluded RCA, and following that revascularization had an improvement in his left ventricular systolic function to 35-40%.      He subsequently had resolution of his apical thrombus and good clinical improvement with resumption of normal activities and increase in his functional capacity.      In 2016, he was hospitalized with lower GI bleed and an associated non-ST elevation myocardial infarction.  He underwent coronary angiography which showed focal restenosis of his proximal RCA that was treated with cutting balloon angioplasty.  His circumflex had patent stents, and the LAD as before was chronically occluded with septal collaterals to the mid LAD.      He states that he is doing well at this time with no recurrent symptoms.  Specifically denies any chest discomfort or shortness of breath and has been golfing almost every day this summer.      ______________________________________________________________________    Problem List:  Patient Active Problem List   Diagnosis     Dyslipidemia, goal LDL below 70     Hypertension     GERD (gastroesophageal reflux disease)     Left ventricular  apical thrombus     Calculus of ureter     Hydronephrosis with urinary obstruction due to ureteral calculus [592.1, 591]     GI bleed     Ischemic cardiomyopathy     CAD (coronary artery disease)     Dyslipidemia     Non-ST elevation myocardial infarction (NSTEMI) (H)       Medical History:  Past Medical History:   Diagnosis Date     CAD (coronary artery disease)      Dyslipidemia, goal LDL below 70      History of anesthesia complications     Nausea     Ischemic cardiomyopathy      Kidney stone      Left ventricular thrombosis      Myocardial infarction      STEMI (ST elevation myocardial infarction) (H)        Surgical History:  Past Surgical History:   Procedure Laterality Date     CORONARY STENT PLACEMENT      Dr. Bansal     ESOPHAGOGASTRODUODENOSCOPY N/A 7/5/2016    Procedure: ESOPHAGOGASTRODUODENOSCOPY;  Surgeon: Fahad Donald MD;  Location: [facility];  Service:      VT KNEE SCOPE,DIAGNOSTIC      Description: Arthroscopy Knee Left;  Recorded: 07/06/2009;  Comments: 1990       Social History:  Social History     Social History     Marital status:      Spouse name: N/A     Number of children: N/A     Years of education: N/A     Occupational History     Retired      Social History Main Topics     Smoking status: Never Smoker     Smokeless tobacco: Never Used     Alcohol use 2.4 - 3.0 oz/week     4 - 5 Glasses of wine per week     Drug use: No     Sexual activity: Yes     Partners: Female     Birth control/ protection: None     Other Topics Concern     Not on file     Social History Narrative       Review of Systems: 12 organ system review done and negative except as noted in the HPI.    Family History:  Family History   Problem Relation Age of Onset     Hyperlipidemia Mother      Hypertension Mother      Stroke Mother      Heart disease Father      Hypertension Father      Hyperlipidemia Father      No Medical Problems Sister      Hyperlipidemia Brother      Hypertension Brother      Urolithiasis Brother  "     No Medical Problems Daughter      No Medical Problems Son      No Medical Problems Maternal Aunt      No Medical Problems Maternal Uncle      No Medical Problems Paternal Aunt      No Medical Problems Paternal Uncle      No Medical Problems Maternal Grandmother      Colon cancer Maternal Grandfather      No Medical Problems Paternal Grandmother      No Medical Problems Paternal Grandfather          Allergies:  Allergies   Allergen Reactions     Crestor [Rosuvastatin] Myalgia     Lipitor [Atorvastatin] Myalgia     Pravastatin Myalgia       Medications:  Current Outpatient Prescriptions   Medication Sig Dispense Refill     acetaminophen (TYLENOL) 650 MG CR tablet Take 650 mg by mouth every 8 (eight) hours as needed for pain.       aspirin 81 MG EC tablet Take 81 mg by mouth bedtime.        cholecalciferol, vitamin D3, (VITAMIN D3) 2,000 unit cap Take 2,000 Units by mouth daily.        losartan (COZAAR) 25 MG tablet Take 1 tablet (25 mg total) by mouth daily. 90 tablet 3     metoprolol succinate (TOPROL-XL) 50 MG 24 hr tablet TAKE 1 TABLET BY MOUTH TWICE DAILY 180 tablet 3     omeprazole (PRILOSEC) 20 MG capsule Take 1 capsule (20 mg total) by mouth daily. 90 capsule 3     spironolactone (ALDACTONE) 25 MG tablet TAKE 1 TABLET BY MOUTH DAILY 90 tablet 3     ticagrelor (BRILINTA) 90 mg Tab Take 1 tablet (90 mg total) by mouth 2 (two) times a day. 180 tablet 4     nitroglycerin (NITROSTAT) 0.3 MG SL tablet Place 1 tablet (0.3 mg total) under the tongue every 5 (five) minutes as needed for chest pain. 90 tablet 12     No current facility-administered medications for this visit.        Objective:   Vital signs:  /80 (Patient Site: Right Arm, Patient Position: Sitting, Cuff Size: Adult Large)  Pulse 64  Resp 16  Ht 5' 10\" (1.778 m)  Wt (!) 234 lb (106.1 kg)  BMI 33.58 kg/m2      Physical Exam:    GENERAL APPEARANCE: Alert, cooperative and in no acute distress.  HEENT: No scleral icterus. No Xanthelasma. Oral " mucuos membranes pink and moist.  NECK: No JVD. Thyroid not visualized  CHEST: clear to auscultation  CARDIOVASCULAR: S1, S2 regular. No significant murmur noted.   PULSES: Radial and posterior tibial pulses are intact and symmetric.   ABDOMEN: Nontender. BS+. No bruits.  EXTREMITIES: No cyanosis, clubbing or edema.  SKIN: Warm, well perfused  NEURO: Grossly nonfocal    Lab Results:  LIPIDS:  Lab Results   Component Value Date    CHOL 187 08/16/2016    CHOL 116 08/11/2015     Lab Results   Component Value Date    HDL 45 08/16/2016    HDL 32 (L) 08/11/2015     Lab Results   Component Value Date    LDLCALC 125 08/16/2016    LDLCALC 61 08/11/2015     Lab Results   Component Value Date    TRIG 83 08/16/2016    TRIG 117 08/11/2015     No components found for: CHOLHDL    BMP:  Lab Results   Component Value Date    CREATININE 1.11 09/28/2017    BUN 16 09/28/2017     09/28/2017    K 4.1 09/28/2017     09/28/2017    CO2 22 09/28/2017         ECG and Cath films independently reviewed      RUSSEL TOM MD  [facility]

## 2021-06-30 NOTE — PROGRESS NOTES
"Progress Notes by Kane Bansal MD at 1/25/2021  8:10 AM     Author: Kane Bansal MD Service: -- Author Type: Physician    Filed: 1/25/2021  8:31 AM Encounter Date: 1/25/2021 Status: Signed    : Kane Bansal MD (Physician)           The patient has been notified of following:     \"This video visit will be conducted via a call between you and your physician/provider. We have found that certain health care needs can be provided without the need for an in-person physical exam.  This service lets us provide the care you need with a video conversation.  If a prescription is necessary we can send it directly to your pharmacy.  If lab work is needed we can place an order for that and you can then stop by our lab to have the test done at a later time.      Patient has given verbal consent to a Video visit? Yes    HEART CARE VIDEO ENCOUNTER        The patient has chosen to have the visit conducted as a video visit, to reduce risk of exposure given the current status of Coronavirus in our community. This video visit is being conducted via a call between the patient and physician/provider. Health care needs are being provided without a physical exam.     Assessment/Recommendations   Assessment/Plan:    Mr. Carey is doing well from the cardiac standpoint having had prior multivessel PCI for severe coronary artery disease and a severe ischemic cardiomyopathy.      As detailed below, he had successful PCI of a chronically occluded right coronary artery as well as a diseased circumflex which resulted in an improvement in his ejection fraction from 25% to 40% and a significant improvement in his functional capacity.      His anterior wall was assessed with cardiac MRI and was found to be largely nonviable, which is why the LAD  has been left untreated.      As he is currently doing well, the goal will be ongoing risk factor modification for which he has been asked to continue his current medical " regimen.      He also knows to continue his dual antiplatelet therapy, but should be able to stop it for any elective procedures.     A repeat echocardiogram was also obtained to reassess his left ventricular systolic function and showed stability to perhaps slight improvement, with an EF of 40-45 %.        Thank you for the opportunity to participate in the care of Joey Carey. Please do not hesitate to call with any questions or concerns regarding his cardiovascular status    Follow Up Plan:  12 months  I have reviewed the note as documented.  This accurately captures the substance of my conversation with the patient.    Total time of video between patient and provider was 10 minutes   Start Time:815   Stop Time:825    Originating Location (pt. Location): Home    Distant Location (provider location):  [facility]     Mode of Communication:  Video Conference via [email or web address]       History of Present Illness/Subjective    Joey Carey is a 60 y.o. male who is being evaluated via a billable video visit and has consented to a video visit.     It was a pleasure to see Joey Carey at the [facility] [place] in follow-up for his coronary artery disease and cardiomyopathy.       Joey Carey is a 60 y.o. male who presented with accelerating chest pain on June 24, 2015 and had an EKG that was concerning for an anterior ST elevation myocardial infarction. He underwent urgent coronary angiography at the time and was found to have an occluded mid LAD, occluded mid RCA and 90% ulcerated lesion in the proximal to mid circumflex. He was referred for coronary artery bypass surgery but further evaluation during his hospitalization revealed a large left ventricular thrombus with an ejection fraction of 25-30%.       Given the presence of the thrombus his bypass surgery was canceled and a viability study was scheduled. It was felt that he would be a better candidate for percutaneous  revascularization if it was felt to be an option.       His MRI viability study subsequently showed good viability in the inferoseptal region. The mid to distal anterior wall and apex did not appear to be viable however. The MRI again confirmed the presence of a large apical thrombus with ejection fraction of 24%.      He then underwent PCI of his proximal circumflex as well as a moderate to large diagonal with Promus drug eluting stents.      He returned a few weeks later and underwent successful PCI of the occluded RCA, and following that revascularization had an improvement in his left ventricular systolic function to 35-40%.      He subsequently had resolution of his apical thrombus and good clinical improvement with resumption of normal activities and increase in his functional capacity.      In 2016, he was hospitalized with lower GI bleed and an associated non-ST elevation myocardial infarction.  He underwent coronary angiography which showed focal restenosis of his proximal RCA that was treated with cutting balloon angioplasty.  His circumflex had patent stents, and the LAD as before was chronically occluded with septal collaterals to the mid LAD.      Since then, he has done well with no recurrent cardiac symptoms. Specifically denies any chest discomfort or shortness of breath and has been golfing almost every day this summer.   I have reviewed and updated the patient's Past Medical History, Social History, Family History and Medication List.     Physical Examination performed via live video encounter Review of Systems   General Appearance:   no distress, normal body habitus, upright.   ENT/Mouth: membranes moist, no nasal discharge or bleeding gums.  Normal head shape, no evidence of injury or laceration.     EYES:  no scleral icterus, normal conjunctivae   Neck: no evidence of thyromegaly.  Supple   Chest/Lungs:   No audible wheezing equal chest wall expansion. Non labored breathing.  No cough.    Cardiovascular:   No evidence of elevated jugular venous pressure.  No evidence of pitting edema bilaterally    Abdomen:  no evidence of abdominal distention. No observe juandice.     Extremities: no cyanosis or clubbing noted.    Skin: no xanthelasma, normal skin color. No evidence of facial lacerations.      Neurologic: Normal arm motion bilateral, no tremors.  No evidence of focal defect.       Psychiatric: alert and oriented x3, calm                                               Medical History  Surgical History Family History Social History   Past Medical History:   Diagnosis Date   ? CAD (coronary artery disease)    ? Dyslipidemia, goal LDL below 70    ? History of anesthesia complications     Nausea   ? Ischemic cardiomyopathy    ? Kidney stone    ? Left ventricular thrombosis    ? Myocardial infarction (H)    ? STEMI (ST elevation myocardial infarction) (H)     Past Surgical History:   Procedure Laterality Date   ? CORONARY STENT PLACEMENT      Dr. Bansal   ? ESOPHAGOGASTRODUODENOSCOPY N/A 7/5/2016    Procedure: ESOPHAGOGASTRODUODENOSCOPY;  Surgeon: Fahad Donald MD;  Location: [facility];  Service:    ? NH KNEE SCOPE,DIAGNOSTIC      Description: Arthroscopy Knee Left;  Recorded: 07/06/2009;  Comments: 1990    Family History   Problem Relation Age of Onset   ? Hyperlipidemia Mother    ? Hypertension Mother    ? Stroke Mother    ? Heart disease Father    ? Hypertension Father    ? Hyperlipidemia Father    ? No Medical Problems Sister    ? Hyperlipidemia Brother    ? Hypertension Brother    ? Urolithiasis Brother    ? No Medical Problems Daughter    ? No Medical Problems Son    ? No Medical Problems Maternal Aunt    ? No Medical Problems Maternal Uncle    ? No Medical Problems Paternal Aunt    ? No Medical Problems Paternal Uncle    ? No Medical Problems Maternal Grandmother    ? Colon cancer Maternal Grandfather    ? No Medical Problems Paternal Grandmother    ? No Medical Problems Paternal Grandfather        Social History     Socioeconomic History   ? Marital status:      Spouse name: Not on file   ? Number of children: Not on file   ? Years of education: Not on file   ? Highest education level: Not on file   Occupational History   ? Occupation: Retired   Social Needs   ? Financial resource strain: Not on file   ? Food insecurity     Worry: Not on file     Inability: Not on file   ? Transportation needs     Medical: Not on file     Non-medical: Not on file   Tobacco Use   ? Smoking status: Never Smoker   ? Smokeless tobacco: Never Used   Substance and Sexual Activity   ? Alcohol use: Yes     Alcohol/week: 4.0 - 5.0 standard drinks     Types: 4 - 5 Glasses of wine per week   ? Drug use: No   ? Sexual activity: Yes     Partners: Female     Birth control/protection: None   Lifestyle   ? Physical activity     Days per week: Not on file     Minutes per session: Not on file   ? Stress: Not on file   Relationships   ? Social connections     Talks on phone: Not on file     Gets together: Not on file     Attends Restoration service: Not on file     Active member of club or organization: Not on file     Attends meetings of clubs or organizations: Not on file     Relationship status: Not on file   ? Intimate partner violence     Fear of current or ex partner: Not on file     Emotionally abused: Not on file     Physically abused: Not on file     Forced sexual activity: Not on file   Other Topics Concern   ? Not on file   Social History Narrative   ? Not on file          Medications  Allergies   Current Outpatient Medications   Medication Sig Dispense Refill   ? acetaminophen (TYLENOL) 650 MG CR tablet Take 650 mg by mouth every 8 (eight) hours as needed for pain.     ? ascorbic acid, vitamin C, (ASCORBIC ACID WITH NEELIMA HIPS) 500 MG tablet Take 500 mg by mouth daily.     ? aspirin 81 MG EC tablet Take 81 mg by mouth bedtime.      ? cholecalciferol, vitamin D3, (VITAMIN D3) 2,000 unit cap Take 2,000 Units by mouth daily.       ? Lactobacillus rhamnosus GG (CULTURELLE) 15 billion cell CpSP Take 1 capsule by mouth daily with breakfast.     ? losartan (COZAAR) 25 MG tablet Take 1 tablet (25 mg total) by mouth daily. 90 tablet 3   ? metoprolol succinate (TOPROL-XL) 50 MG 24 hr tablet TAKE 1 TABLET BY MOUTH TWICE A  tablet 3   ? nitroglycerin (NITROSTAT) 0.3 MG SL tablet Place 1 tablet (0.3 mg total) under the tongue every 5 (five) minutes as needed for chest pain. 90 tablet 12   ? omeprazole (PRILOSEC) 20 MG capsule Take 1 capsule (20 mg total) by mouth daily before breakfast. 90 capsule 3   ? spironolactone (ALDACTONE) 25 MG tablet TAKE 1 TABLET BY MOUTH EVERY DAY 90 tablet 3   ? ticagrelor (BRILINTA) 90 mg Tab Take 1 tablet (90 mg total) by mouth 2 (two) times a day. 180 tablet 1     No current facility-administered medications for this visit.     Allergies   Allergen Reactions   ? Crestor [Rosuvastatin] Myalgia   ? Lipitor [Atorvastatin] Myalgia   ? Pravastatin Myalgia         Lab Results    Chemistry/lipid CBC Cardiac Enzymes/BNP/TSH/INR   Lab Results   Component Value Date    CHOL 187 08/16/2016    HDL 45 08/16/2016    LDLCALC 125 08/16/2016    TRIG 83 08/16/2016    CREATININE 1.11 09/28/2017    BUN 16 09/28/2017    K 4.1 09/28/2017     09/28/2017     09/28/2017    CO2 22 09/28/2017    Lab Results   Component Value Date    WBC 5.6 09/28/2017    HGB 15.9 09/28/2017    HCT 47.0 09/28/2017    MCV 87 09/28/2017     09/28/2017    Lab Results   Component Value Date    CKMB 18 (HH) 06/24/2015    TROPONINI 14.09 (HH) 07/07/2016    BNP 90 (H) 07/06/2016    INR 0.97 09/28/2017        Kane Bansal MD

## 2021-12-29 ENCOUNTER — TELEPHONE (OUTPATIENT)
Dept: INTERNAL MEDICINE | Facility: CLINIC | Age: 61
End: 2021-12-29
Payer: COMMERCIAL

## 2021-12-29 LAB
HOLD SPECIMEN: NORMAL

## 2021-12-29 NOTE — ED NOTES
[facility] called back stating she is attempting to get a hold of NOK to discuss organ donation, Jimmy [facility] contact is going to call back

## 2021-12-29 NOTE — DEATH PRONOUNCEMENT
MD DEATH PRONOUNCEMENT    Called to pronounce Joey Carey dead.    Physical Exam: Unresponsive to noxious stimuli, Spontaneous respirations absent, Breath sounds absent, Carotid pulse absent, Heart sounds absent, Pupillary light reflex absent and Corneal blink reflex absent    Patient was pronounced dead at 1129:PM, 2021.    Preliminary Cause of Death: Cardiorespiratory arrest    Active Problems:    ACS (acute coronary syndrome) (H)       Infectious disease present?: NO    Communicable disease present? (examples: HIV, chicken pox, TB, Ebola, CJD) :  NO    Multi-drug resistant organism present? (example: MRSA): NO    Please consider an autopsy if any of the following exist:  NO Unexpected or unexplained death during or following any dental, medical, or surgical diagnostic treatment procedures.   NO Death of mother at or up to seven days after delivery.     NO All  and pediatric deaths.     NO Death where the cause is sufficiently obscure to delay completion of the death certificate.   NO Deaths in which autopsy would confirm a suspected illness/condition that would affect surviving family members or recipients of transplanted organs.     The following deaths must be reported to the 's Office:  NO A death that may be due entirely or in part to any factors other than natural disease (recent surgery, recent trauma, suspected abuse/neglect).   NO A death that may be an accident, suicide, or homicide.     NO Any sudden, unexpected death in which there is no prior history of significant heart disease or any other condition associated with sudden death.   NO A death under suspicious, unusual, or unexpected circumstances.    NO Any death which is apparently due to natural causes but in which the  does not have a personal physician familiar with the patient s medical history, social, or environmental situation or the circumstances of the terminal event.   NO Any death apparently due to  Sudden Infant Death Syndrome.     NO Deaths that occur during, in association with, or as consequences of a diagnostic, therapeutic, or anesthetic procedure.   NO Any death in which a fracture of a major bone has occurred within the past (6) six months.   NO A death of persons note seen by their physician within 120 days of demise.     NO Any death in which the  was an inmate of a public institution or was in the custody of Law Enforcement personnel.   NO  All unexpected deaths of children   NO Solid organ donors   NO Unidentified bodies   NO Deaths of persons whose bodies are to be cremated or otherwise disposed of so that the bodies will later be unavailable for examination;   NO Deaths unattended by a physician outside of a licensed healthcare facility or licensed residential hospice program   YES Deaths occurring within 24 hours of arrival to a health care facility if death is unexpected.    NO Deaths associated with the decedent s employment.   NO Deaths attributed to acts of terrorism.   NO Any death in which there is uncertainty as to whether it is a medical examiner s care should be discussed with the medical investigator.

## 2021-12-29 NOTE — ED TRIAGE NOTES
Pt brought in by EMS in cardiac arrest. He was shoveling at his parents house and was found down in the snow. He had significant facial trauma. He was down for possibly thirty minutes. EMS gave 2 doses of Epi and defibrillated the patient twice en route. Pt arrived in [facility], but was initially Asystole, went into VFib after first dose of Epi. He does have a history of a heart attack five years ago with five stents placed. Code Blue charting was immediately started on paper chart.

## 2021-12-29 NOTE — ED NOTES
Bed: JNED-12  Expected date: 12/28/21  Expected time:   Means of arrival: Ambulance  Comments:  [place]  arrest

## 2021-12-29 NOTE — ED NOTES
Patient picked up by Jodie, David Ibanez, License # [id number]. Will be taking the patient to [facility] at [address] [facility] [address] ([id number]). They will then be arranging transport to [facility], [address] [place] [address]

## 2021-12-29 NOTE — ED PROVIDER NOTES
EMERGENCY DEPARTMENT ENCOUNTER      NAME: Joey Carey  AGE: 61 year old male  YOB: 1960  MRN: [id number]  EVALUATION DATE & TIME: 12/28/2021  8:13 PM    PCP: Anupama Montgomery    ED PROVIDER: Brenda Persaud M.D.      Chief Complaint   Patient presents with     Cardiac Arrest     FINAL IMPRESSION:  1. ACS (acute coronary syndrome) (H)    2. Cardiac arrest (H)    3. Ventricular fibrillation (H)    4. Fall, initial encounter    5. Laceration of nose, initial encounter      ED COURSE & MEDICAL DECISION MAKING:    Pertinent Labs & Imaging studies reviewed. (See chart for details)    8:10 PM I met with the patient. He was in PEA on arrival  8:12 PM Epinephrine administered  8:15 PM Epinephrine administered  8:18 PM Patient shocked at 200 for ventricular fibrillation  8:19 PM Epinephrine given  8:20 PM Patient shocked at 360. 300 Amiodarone administered.  8:21 PM Epinephrine administered  8:22 PM Pulse returned  8:30 PM I intubated patient  8:34 PM Dr. Dewey spoke to Dr. Nravaez from Cardiology in my place  8:39 PM Pulse lost, CPR started  8:42 PM Dr. Dewey spoke to Dr. Narvaez from Cardiology in my place  8:44 PM Pulse returned  8:45 PM Level 1 STEMI called.  8:53 PM Patient currently on way to CT.  8:57 PM I spoke with the patient's family  9:00 PM Patient started to desat in CT. I was called down to CT. Patient lost his pulse, CPR started  9:27 PM Dr. Dewey spoke to Cath Lab in my place  9:30 PM Pulse returned, Amiodarone drip started and increased norepinephrine  9:34 PM Lost pulse again  9:50 PM Cath Lab team arrived.  9:55 PM Pulse returned  10:03 PM Patient being transferred to Cath Lab at this time.    ED Course as of 12/28/21 2324   Tue Dec 28, 2021   2320 Patient is a 61-year-old male with a history of stents 5 years ago.  According to his wife his a total of five stents.  He is also currently on Brilinta and Eliquis.  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  He was brought  in today after he was found down while snowblowing.  On arrival here he was in cardiac arrest.  EMS had reported a shockable rhythm at some point.  On exam he had a significant laceration to his nose and there was some concern for potential head injury.  I initial thought was that he had a cardiac event and then fell causing his facial laceration and injury.  ACLS protocol was started and I did look with ultrasound multiple times.  The first time there was just a quiver of heart movement but the second time we did see a little bit more movement.  Shortly after that we did get a pulse back.  Patient was put on nor epi drip to help support his blood pressure.  Once we had him somewhat stabilized we were able to send him down for CT imaging to evaluate for potential head bleed.  I have also ordered imaging of his neck and his chest and abdomen and pelvis to look for any signs of trauma or hemorrhage that would be contributing to his symptoms.  After getting in his head CT he lost pulses in the CT scanner.  I went there and we restarted CPR and began ACLS protocol again.  I have just spoken with family and updated them on his status.  Patient regained pulses multiple times and then lost him multiple times.  He had a shockable rhythm several times and was shocked each time.  He was given numerous doses of epinephrine.  Please see the nursing note to see all of these medications that have been given.  We were able to get a hold of cardiology and they did come in and activate the Cath Lab and the plan was to take him to the Cath Lab.  We will coding him where he had multiple episodes of V. tach and V. fib and was shocked at every pulse check.  Partway through we did bring the wife into the room to hold his hand.     2323 Cardiology came down at bedside and we all talked about what was going on in his prognosis.  We did get pulses back and so we rushed him up to the Cath Lab to see if they could open something up and get  his heart stabilized.         At the conclusion of the encounter I discussed  the results of all of the tests and the disposition with patient's wife.   All questions were answered.  The patient acknowledged understanding and was involved in the decision making regarding the overall care plan.      110 minutes of critical care time     MEDICATIONS GIVEN IN THE EMERGENCY:  Medications   atropine injection 1 mg (has no administration in time range)   aspirin (ASA) chewable tablet 324 mg (has no administration in time range)   ticagrelor (BRILINTA) tablet 180 mg (has no administration in time range)   heparin (porcine) injection 1000 units/mL (rounds in 500 unit increments) (has no administration in time range)   norepinephrine (LEVOPHED) 4 mg in  mL infusion PREMIX (has no administration in time range)   amiodarone (NEXTERONE) bolus 150 mg (has no administration in time range)   amiodarone (NEXTERONE) 900 mg in sodium chloride in non-PVC bag 0.9 % 500 mL infusion (has no administration in time range)   amiodarone (NEXTERONE) 900 mg in sodium chloride in non-PVC bag 0.9 % 500 mL infusion (has no administration in time range)     =================================================================    HPI    Triage Note: No notes on file    Patient information was obtained from: EMS    Use of : N/A     Joey Carey is a 61 year old male with a history of CAD, ischemic cardiomyopathy, HTN, NSTEMI, dyslipidemia, GERD, who presents to the ED via EMS for the evaluation of cardiac arrest.    HPI Limited secondary to acuity of condition    Per EMS, patient was snow blowing when he was found on the ground unconscious with blood surrounding nose after fall. Family immediately started CPR. Upon EMS arrival, they noted there had been a 30 minute max downtime. Initially, patient was found to be in systole, then was shocked once. Two minutes later, patient was given epinephrine and was subsequently found to be in  V-fib. Tidals were in the upper 80's. Patient then was found to be PEA. Patient was given 3 of epinephrine total. Patient was not placed in LISA en route to ED as he did not fit so CPR was continued. Per EMS, patient had a heart attack five years ago and had 5 stents placed. Patient is on anticoagulants.    Per chart review, patient was seen on 12/14/21-12/16/21 at [facility] for splenic infarct. ED workup shows CT findings of what appears to be a splenic infarct. WBC at 14K with left shift. Vascular surgery consult called. Due to concerns about continued abdominal pain and findings of unexplained splenic infarct we were asked to admit this patient on to our service and thus patient will be admitted for further evaluation and treatment. ECHO performed showing PFO and depressed ejection fraction of 35-40% with akinesis throughout PFO. Patient was recommended to follow up with PCP and to continue with Eliquis and Brilinta for now. Patient was sent home on Apixaban, Oxycodone, and Pantoprazole.     REVIEW OF SYSTEMS   ROS Limited secondary to acuity of condition  Except as stated in the HPI all other systems reviewed and are negative.    PAST MEDICAL HISTORY:  History reviewed. No pertinent past medical history.    PAST SURGICAL HISTORY:  Past Surgical History:   Procedure Laterality Date     CORONARY STENT PLACEMENT      Dr. Bansal     ESOPHAGOSCOPY, GASTROSCOPY, DUODENOSCOPY (EGD), COMBINED N/A 7/5/2016    Procedure: ESOPHAGOGASTRODUODENOSCOPY;  Surgeon: Fahad Donald MD;  Location: [facility];  Service:       KNEE SCOPE, DIAGNOSTIC      Description: Arthroscopy Knee Left;  Recorded: 07/06/2009;  Comments: 1990       CURRENT MEDICATIONS:      Current Facility-Administered Medications:      amiodarone (NEXTERONE) 900 mg in sodium chloride in non-PVC bag 0.9 % 500 mL infusion, 1 mg/min, Intravenous, Continuous, Brenda Persaud MD     [START ON 12/29/2021] amiodarone (NEXTERONE) 900 mg in sodium  chloride in non-PVC bag 0.9 % 500 mL infusion, 0.5 mg/min, Intravenous, Continuous, Brenda Persaud MD     amiodarone (NEXTERONE) bolus 150 mg, 150 mg, Intravenous, Once, Brenda Persaud MD     aspirin (ASA) chewable tablet 324 mg, 324 mg, Oral, Once, Mayo Dewey MD     atropine injection 1 mg, 1 mg, Intravenous, Once, Brenda Persaud MD     heparin (porcine) injection 1000 units/mL (rounds in 500 unit increments), 70 Units/kg (Dosing Weight), Intravenous, Once, Mayo Dewey MD     norepinephrine (LEVOPHED) 4 mg in  mL infusion PREMIX, 0.01-0.6 mcg/kg/min (Dosing Weight), Intravenous, Continuous, Brenda Persaud MD     ticagrelor (BRILINTA) tablet 180 mg, 180 mg, Oral, Once, Mayo Dewey MD    ALLERGIES:  Allergies   Allergen Reactions     Atorvastatin Muscle Pain (Myalgia)     Crestor [Rosuvastatin] Muscle Pain (Myalgia)     Pravastatin Muscle Pain (Myalgia)       FAMILY HISTORY:  Family History   Problem Relation Age of Onset     Hyperlipidemia Mother      Hypertension Mother      Cerebrovascular Disease Mother      Heart Disease Father      Hypertension Father      Hyperlipidemia Father      No Known Problems Sister      Hyperlipidemia Brother      Hypertension Brother      Urolithiasis Brother      No Known Problems Daughter      No Known Problems Son      No Known Problems Maternal Aunt      No Known Problems Maternal Uncle      No Known Problems Paternal Aunt      No Known Problems Paternal Uncle      No Known Problems Maternal Grandmother      Colon Cancer Maternal Grandfather      No Known Problems Paternal Grandmother      No Known Problems Paternal Grandfather        SOCIAL HISTORY:   Social History     Socioeconomic History     Marital status:      Spouse name: Not on file     Number of children: Not on file     Years of education: Not on file     Highest education level: Not on file   Occupational History     Not on file   Tobacco Use     Smoking  status: Never Smoker     Smokeless tobacco: Never Used   Substance and Sexual Activity     Alcohol use: Yes     Alcohol/week: 4.0 - 5.0 standard drinks     Drug use: No     Sexual activity: Yes     Partners: Female     Birth control/protection: None   Other Topics Concern     Not on file   Social History Narrative     Not on file     Social Determinants of Health     Financial Resource Strain: Not on file   Food Insecurity: Not on file   Transportation Needs: Not on file   Physical Activity: Not on file   Stress: Not on file   Social Connections: Not on file   Intimate Partner Violence: Not on file   Housing Stability: Not on file       PHYSICAL EXAM    VITAL SIGNS: There were no vitals taken for this visit.   GENERAL: Unresponsive, large laceration along nose with blood  HEENT: Normal cephalic, nasal laceration,bruising to right side of head and ear, No scleral icterus  NECK: No obvious swelling or abnormality  PULMONARY: Bilateral breath sounds with bagging  CARDIOVASCULAR: No pulses on arrival  ABDOMINAL: Soft, Nontender  EXTREMITIES: Moves no extremities spontaneously, warm, no edema, No major deformities  NEURO: No movement  PSYCH: unresponsive  SKIN: No rashes on visualized skin, dry, warm       LAB:  All pertinent labs reviewed and interpreted.  Results for orders placed or performed during the hospital encounter of 12/28/21   Head CT w/o contrast    Impression    IMPRESSION:  1.  No CT findings of acute intracranial process.  2.  Laceration involving the superficial soft tissues of the right aspect of the nose.   Extra Green Top (Lithium Heparin) Tube   Result Value Ref Range    Hold Specimen JIC    Extra Purple Top Tube   Result Value Ref Range    Hold Specimen JIC    Extra Green Top (Lithium Heparin) ON ICE   Result Value Ref Range    Hold Specimen JIC    Extra Blue Top Tube   Result Value Ref Range    Hold Specimen JIC    Result Value Ref Range    INR 1.36 (H) 0.85 - 1.15   Partial thromboplastin time    Result Value Ref Range    aPTT 38 22 - 38 Seconds   Basic metabolic panel   Result Value Ref Range    Sodium 141 136 - 145 mmol/L    Potassium 3.8 3.5 - 5.0 mmol/L    Chloride 103 98 - 107 mmol/L    Carbon Dioxide (CO2) 14 (L) 22 - 31 mmol/L    Anion Gap 24 (H) 5 - 18 mmol/L    Urea Nitrogen 18 8 - 22 mg/dL    Creatinine 1.94 (H) 0.70 - 1.30 mg/dL    Calcium 9.7 8.5 - 10.5 mg/dL    Glucose 296 (H) 70 - 125 mg/dL    GFR Estimate 39 (L) >60 mL/min/1.73m2   CBC with platelets and differential   Result Value Ref Range    WBC Count 6.2 4.0 - 11.0 10e3/uL    RBC Count 4.85 4.40 - 5.90 10e6/uL    Hemoglobin 14.7 13.3 - 17.7 g/dL    Hematocrit 47.6 40.0 - 53.0 %    MCV 98 78 - 100 fL    MCH 30.3 26.5 - 33.0 pg    MCHC 30.9 (L) 31.5 - 36.5 g/dL    RDW 12.5 10.0 - 15.0 %    Platelet Count 325 150 - 450 10e3/uL   Manual Differential   Result Value Ref Range    % Neutrophils 26 %    % Lymphocytes 64 %    % Monocytes 7 %    % Eosinophils 1 %    % Basophils 0 %    % Metamyelocytes 1 %    % Myelocytes 1 %    NRBCs per 100 WBC 1 (H) <=0 %    Absolute Neutrophils 1.6 1.6 - 8.3 10e3/uL    Absolute Lymphocytes 4.0 0.8 - 5.3 10e3/uL    Absolute Monocytes 0.4 0.0 - 1.3 10e3/uL    Absolute Eosinophils 0.1 0.0 - 0.7 10e3/uL    Absolute Basophils 0.0 0.0 - 0.2 10e3/uL    Absolute Metamyelocytes 0.1 (H) <=0.0 10e3/uL    Absolute Myelocytes 0.1 (H) <=0.0 10e3/uL    Absolute NRBCs 0.1 (H) <=0.0 10e3/uL    RBC Morphology Confirmed RBC Indices     Platelet Assessment  Automated Count Confirmed. Platelet morphology is normal.     Automated Count Confirmed. Platelet morphology is normal.    Reactive Lymphocytes Present (A) None Seen       RADIOLOGY:  Head CT w/o contrast   Final Result   IMPRESSION:   1.  No CT findings of acute intracranial process.   2.  Laceration involving the superficial soft tissues of the right aspect of the nose.      Cervical spine CT w/o contrast    (Results Pending)   CT Chest/Abdomen/Pelvis w Contrast    (Results  "Pending)   Cardiac Catheterization    (Results Pending)       EKG:    Date and time: 28-Dec-2021, 20:24:55  Rate: 79 bpm  Rhythm: Wide QRS rhythm that appears to be sinus  ND interval: 248 ms  QRS interval: 182 ms  QT/QTc: 435 ms  ST changes or T wave changes: Some ST elevation in V2 and V3 Q waves in aVR, V1, V2, V3  Change from prior ECG: Significant change from prior.  QRS is widened with a new right bundle branch block.  I have independently reviewed and interpreted the EKG(s) documented above.    PROCEDURES:   [facility] Procedure Note          Intubation:     Date/Time: 10:32 PM  Performed by: Dr. Brenda Persaud    The procedure was performed in an emergent situation.  Risks and benefits: risks, benefits and alternatives were discussed.  Patient identity confirmed: verbally with patient and arm band  Time out: Immediately prior to procedure a \"time out\" was called to verify the correct patient, procedure, equipment, support staff and site/side marked as required.    Indication: Acute respiratory failure. and Airway protection.    Intubation method: Glidescope  Patient status: Unconscious  Preoxygenation: Mask  Pretreatment medications: None  Sedatives: None  Paralytic: None  Laryngoscope size: Mac 3  Tube size: 7.5 cuffed with cuff inflated after placement  Number of attempts: 1  Cricoid pressure: no  Cords visualized: yes  Post-procedure assessment: Breath sounds equal bilaterally with chest rise and absent over the epigastrium, Chest x-ray interpreted by me demonstrating endotracheal tube in appropriate position and CO2 detector.  ETT to teeth: 24 cm  Tube secured with: ETT grey    Patient tolerated the procedure well with no immediate complications.  Complications:  None      Critical Care     Performed by: Dr Brenda Persaud  Authorized by: Dr Brenda Persaud  Total critical care time: 110 minutes  Critical care was necessary to treat or prevent imminent or life-threatening deterioration of the " following conditions: Cardiac arrest with multiple return of pulses requiring multiple medications, consultants, and direction of care  Critical care was time spent personally by me on the following activities: development of treatment plan with patient or surrogate, discussions with consultants, examination of patient, evaluation of patient's response to treatment, obtaining history from patient or surrogate, ordering and performing treatments and interventions, ordering and review of laboratory studies, ordering and review of radiographic studies, re-evaluation of patient's condition and monitoring for potential decompensation.  Critical care time was exclusive of separately billable procedures and treating other patients.    I, Flavia Leiva, am serving as a scribe to document services personally performed by Dr. Persaud based on my observation and the provider's statements to me. I, Brenda Persaud MD attest that Flavia Leiva is acting in a scribe capacity, has observed my performance of the services and has documented them in accordance with my direction.    Brenda Persaud M.D.  Emergency Medicine  [facility] EMERGENCY DEPARTMENT  [facility] [facility] [id number] [phone]  Dept: [phone]     Brenda Persaud MD  12/28/21 0040

## 2021-12-29 NOTE — ED NOTES
Please see Paper Code Blue chart for all charting including medications given, rhythms, shocks, etc.

## 2021-12-29 NOTE — PROGRESS NOTES
Joey Carey is a 61 year old old male with history of multi-vessel CAD s/p remote high risk PCI to Lcx/OM/Diag after he was deemed to be at a prohibitive surgical risk on multiple occasions/hospital, followed by staged  PCI to RCA '15, pRCA cutting balloon angioplasty '16 in the setting of NSTEMI; known LAD  w/ no viability, ischemic cardiomyopathy LVEF 30's-> 40-45 after revascularization, history of LV thrombus, high BMI, recent splenic infarct presumed to be due to cryptogenic embolization through a PFO who is here for unwitnessed cardiac arrest of PEA as well as VT/VF mechanisms, concern for anterior ST elevations on EKG (albeit RBBB pattern), prolonged resuscitation effort in the field and ED.      - severe prox and dRCA lesions but vessel is patent at this point, no revascularization performed due to profound metabolic and respiratory derangement and inability to oxgynate or ventilate (pH 6.8 in spite of maximal bagging/ventilation, ETT in place on fluoroscopy, La 17, cre 2 from normal baseline), no evidence of CNS activity. As per discussion with the patient's family, he wasn't interested in heroic resuscitation measures with low probability of good residual quality of life, including ECMO  - notably, his total downtime was unknown, mechanism of initial arrest appears to have been PEA w/ intermittent/incessant VT/VF after administration of ~20 rounds of epinephrine in the ED, in addition to 450mg amiodarone+drip, lidocaine, norepinephrine drips and HCO3 boluses  - given apparent futility of further resuscitative measures, and respectful of patient's wishes as conveyed by family, after discussion involving our team, ED team, patient's family, perfusion, and CTS, we stopped Wil CPR w/ immediate asystole/lack of perfusion, no spontaneous breathing, fixed and dilated pupils (noted since the ED in spite of no sedation), no response to painful stimuli or heart beat  - based on the above time of death  was pronounced at 1129

## 2022-01-09 DIAGNOSIS — I25.10 CORONARY ARTERY DISEASE INVOLVING NATIVE CORONARY ARTERY OF NATIVE HEART WITHOUT ANGINA PECTORIS: ICD-10-CM

## 2022-01-09 RX ORDER — TICAGRELOR 90 MG/1
TABLET ORAL
Qty: 180 TABLET | Refills: 1 | OUTPATIENT
Start: 2022-01-09

## 2023-11-06 NOTE — TELEPHONE ENCOUNTER
Reason for Call:  Other Confirmation of taking care of death certificate    Detailed comments: Patient passed away    Phone Number Patient can be reached at: Other phone number:  [phone]    Best Time: Any time    Can we leave a detailed message on this number? YES    Call taken on 12/29/2021 at 11:06 AM by Juvenal Heredia     Nasal Turnover Hinge Flap Text: The defect edges were debeveled with a #15 scalpel blade.  Given the size, depth, location of the defect and the defect being full thickness a nasal turnover hinge flap was deemed most appropriate. Using a sterile surgical marker, an appropriate hinge flap was drawn incorporating the defect. The area thus outlined was incised with a #15 scalpel blade. The flap was designed to recreate the nasal mucosal lining and the alar rim. The skin margins were undermined to an appropriate distance in all directions utilizing iris scissors. Following this, the designed flap was carried over into the primary defect and sutured into place

## (undated) DEVICE — CUSTOM PACK CORONARY SAN5BCRHEA

## (undated) DEVICE — DEVICE INFLATION W/KIT & 6IN TUBING

## (undated) DEVICE — CATH ANGIO INFINITI JR4 4FRX100CM 538421

## (undated) DEVICE — SHTH INTRO 0.021IN ID 6FR DIA

## (undated) DEVICE — GUIDEWIRE FORTE FLOPPY J TOP 34949-05J

## (undated) DEVICE — VALVE HEMOSTASIS .096" COPILOT MECH 1003331

## (undated) DEVICE — KIT HAND CONTROL ACIST 014644 AR-P54

## (undated) DEVICE — SHEATH ULTIMUM 6FR 12CM 407845

## (undated) DEVICE — ELECTRODE ADULT PACING MULTI P-211-M1

## (undated) DEVICE — SYR ANGIOGRAPHY MULTIUSE KIT ACIST 014612

## (undated) DEVICE — MANIFOLD KIT ANGIO AUTOMATED 014613

## (undated) DEVICE — INTRO MICRO MINI STICK 4FR STIFF NITINOL 45-753